# Patient Record
Sex: FEMALE | Race: WHITE | HISPANIC OR LATINO | Employment: UNEMPLOYED | URBAN - METROPOLITAN AREA
[De-identification: names, ages, dates, MRNs, and addresses within clinical notes are randomized per-mention and may not be internally consistent; named-entity substitution may affect disease eponyms.]

---

## 2019-02-15 ENCOUNTER — OFFICE VISIT (OUTPATIENT)
Dept: INTERNAL MEDICINE | Facility: CLINIC | Age: 24
End: 2019-02-15

## 2019-02-15 VITALS
TEMPERATURE: 98.1 F | RESPIRATION RATE: 16 BRPM | WEIGHT: 193.4 LBS | HEART RATE: 87 BPM | DIASTOLIC BLOOD PRESSURE: 70 MMHG | SYSTOLIC BLOOD PRESSURE: 114 MMHG | HEIGHT: 57 IN | OXYGEN SATURATION: 98 % | BODY MASS INDEX: 41.72 KG/M2

## 2019-02-15 DIAGNOSIS — Z78.9: ICD-10-CM

## 2019-02-15 DIAGNOSIS — Z13.220 LIPID SCREENING: ICD-10-CM

## 2019-02-15 DIAGNOSIS — Z13.1 DIABETES MELLITUS SCREENING: ICD-10-CM

## 2019-02-15 DIAGNOSIS — Z00.00 ANNUAL PHYSICAL EXAM: Primary | ICD-10-CM

## 2019-02-15 DIAGNOSIS — E53.8 B12 DEFICIENCY: ICD-10-CM

## 2019-02-15 DIAGNOSIS — Z83.3 FAMILY HISTORY OF DIABETES MELLITUS: ICD-10-CM

## 2019-02-15 DIAGNOSIS — E66.01 MORBID OBESITY (HCC): ICD-10-CM

## 2019-02-15 DIAGNOSIS — E55.9 VITAMIN D DEFICIENCY: ICD-10-CM

## 2019-02-15 DIAGNOSIS — Z13.29 THYROID DISORDER SCREEN: ICD-10-CM

## 2019-02-15 LAB
BILIRUB BLD-MCNC: NEGATIVE MG/DL
CLARITY, POC: CLEAR
COLOR UR: YELLOW
GLUCOSE UR STRIP-MCNC: NEGATIVE MG/DL
KETONES UR QL: NEGATIVE
LEUKOCYTE EST, POC: NEGATIVE
NITRITE UR-MCNC: NEGATIVE MG/ML
PH UR: 6 [PH] (ref 5–8)
PROT UR STRIP-MCNC: NEGATIVE MG/DL
RBC # UR STRIP: NEGATIVE /UL
SP GR UR: 1.03 (ref 1–1.03)
UROBILINOGEN UR QL: NORMAL

## 2019-02-15 PROCEDURE — 99395 PREV VISIT EST AGE 18-39: CPT | Performed by: NURSE PRACTITIONER

## 2019-02-15 PROCEDURE — 81003 URINALYSIS AUTO W/O SCOPE: CPT | Performed by: NURSE PRACTITIONER

## 2019-02-15 NOTE — PATIENT INSTRUCTIONS
Obesity, Adult  Obesity is having too much body fat. If you have a BMI of 30 or more, you are obese. BMI is a number that explains how much body fat you have. Obesity is often caused by taking in (consuming) more calories than your body uses.  Obesity can cause serious health problems. Changing your lifestyle can help to treat obesity.  Follow these instructions at home:  Eating and drinking    · Follow advice from your doctor about what to eat and drink. Your doctor may tell you to:  ? Cut down on (limit) fast foods, sweets, and processed snack foods.  ? Choose low-fat options. For example, choose low-fat milk instead of whole milk.  ? Eat 5 or more servings of fruits or vegetables every day.  ? Eat at home more often. This gives you more control over what you eat.  ? Choose healthy foods when you eat out.  ? Learn what a healthy portion size is. A portion size is the amount of a certain food that is healthy for you to eat at one time. This is different for each person.  ? Keep low-fat snacks available.  ? Avoid sugary drinks. These include soda, fruit juice, iced tea that is sweetened with sugar, and flavored milk.  ? Eat a healthy breakfast.  · Drink enough water to keep your pee (urine) clear or pale yellow.  · Do not go without eating for long periods of time (do not fast).  · Do not go on popular or trendy diets (fad diets).  Physical Activity  · Exercise often, as told by your doctor. Ask your doctor:  ? What types of exercise are safe for you.  ? How often you should exercise.  · Warm up and stretch before being active.  · Do slow stretching after being active (cool down).  · Rest between times of being active.  Lifestyle  · Limit how much time you spend in front of your TV, computer, or video game system (be less sedentary).  · Find ways to reward yourself that do not involve food.  · Limit alcohol intake to no more than 1 drink a day for nonpregnant women and 2 drinks a day for men. One drink equals 12 oz  of beer, 5 oz of wine, or 1½ oz of hard liquor.  General instructions  · Keep a weight loss journal. This can help you keep track of:  ? The food that you eat.  ? The exercise that you do.  · Take over-the-counter and prescription medicines only as told by your doctor.  · Take vitamins and supplements only as told by your doctor.  · Think about joining a support group. Your doctor may be able to help with this.  · Keep all follow-up visits as told by your doctor. This is important.  Contact a doctor if:  · You cannot meet your weight loss goal after you have changed your diet and lifestyle for 6 weeks.  This information is not intended to replace advice given to you by your health care provider. Make sure you discuss any questions you have with your health care provider.  Document Released: 03/11/2013 Document Revised: 05/25/2017 Document Reviewed: 10/05/2016  [employer] Interactive Patient Education © 2018 [employer]    Calorie Counting for Weight Loss  Calories are units of energy. Your body needs a certain amount of calories from food to keep you going throughout the day. When you eat more calories than your body needs, your body stores the extra calories as fat. When you eat fewer calories than your body needs, your body burns fat to get the energy it needs.  Calorie counting means keeping track of how many calories you eat and drink each day. Calorie counting can be helpful if you need to lose weight. If you make sure to eat fewer calories than your body needs, you should lose weight. Ask your health care provider what a healthy weight is for you.  For calorie counting to work, you will need to eat the right number of calories in a day in order to lose a healthy amount of weight per week. A dietitian can help you determine how many calories you need in a day and will give you suggestions on how to reach your calorie goal.  · A healthy amount of weight to lose per week is usually 1-2 lb (0.5-0.9 kg). This  usually means that your daily calorie intake should be reduced by 500-750 calories.  · Eating 1,200 - 1,500 calories per day can help most women lose weight.  · Eating 1,500 - 1,800 calories per day can help most men lose weight.    What do I need to know about calorie counting?  In order to meet your daily calorie goal, you will need to:  · Find out how many calories are in each food you would like to eat. Try to do this before you eat.  · Decide how much of the food you plan to eat.  · Write down what you ate and how many calories it had. Doing this is called keeping a food log.    To successfully lose weight, it is important to balance calorie counting with a healthy lifestyle that includes regular activity. Aim for 150 minutes of moderate exercise (such as walking) or 75 minutes of vigorous exercise (such as running) each week.  Where do I find calorie information?    The number of calories in a food can be found on a Nutrition Facts label. If a food does not have a Nutrition Facts label, try to look up the calories online or ask your dietitian for help.  Remember that calories are listed per serving. If you choose to have more than one serving of a food, you will have to multiply the calories per serving by the amount of servings you plan to eat. For example, the label on a package of bread might say that a serving size is 1 slice and that there are 90 calories in a serving. If you eat 1 slice, you will have eaten 90 calories. If you eat 2 slices, you will have eaten 180 calories.  How do I keep a food log?  Immediately after each meal, record the following information in your food log:  · What you ate. Don't forget to include toppings, sauces, and other extras on the food.  · How much you ate. This can be measured in cups, ounces, or number of items.  · How many calories each food and drink had.  · The total number of calories in the meal.    Keep your food log near you, such as in a small notebook in your  "pocket, or use a mobile yajaira or website. Some programs will calculate calories for you and show you how many calories you have left for the day to meet your goal.  What are some calorie counting tips?  · Use your calories on foods and drinks that will fill you up and not leave you hungry:  ? Some examples of foods that fill you up are nuts and nut butters, vegetables, lean proteins, and high-fiber foods like whole grains. High-fiber foods are foods with more than 5 g fiber per serving.  ? Drinks such as sodas, specialty coffee drinks, alcohol, and juices have a lot of calories, yet do not fill you up.  · Eat nutritious foods and avoid empty calories. Empty calories are calories you get from foods or beverages that do not have many vitamins or protein, such as candy, sweets, and soda. It is better to have a nutritious high-calorie food (such as an avocado) than a food with few nutrients (such as a bag of chips).  · Know how many calories are in the foods you eat most often. This will help you calculate calorie counts faster.  · Pay attention to calories in drinks. Low-calorie drinks include water and unsweetened drinks.  · Pay attention to nutrition labels for \"low fat\" or \"fat free\" foods. These foods sometimes have the same amount of calories or more calories than the full fat versions. They also often have added sugar, starch, or salt, to make up for flavor that was removed with the fat.  · Find a way of tracking calories that works for you. Get creative. Try different apps or programs if writing down calories does not work for you.  What are some portion control tips?  · Know how many calories are in a serving. This will help you know how many servings of a certain food you can have.  · Use a measuring cup to measure serving sizes. You could also try weighing out portions on a kitchen scale. With time, you will be able to estimate serving sizes for some foods.  · Take some time to put servings of different foods " on your favorite plates, bowls, and cups so you know what a serving looks like.  · Try not to eat straight from a bag or box. Doing this can lead to overeating. Put the amount you would like to eat in a cup or on a plate to make sure you are eating the right portion.  · Use smaller plates, glasses, and bowls to prevent overeating.  · Try not to multitask (for example, watch TV or use your computer) while eating. If it is time to eat, sit down at a table and enjoy your food. This will help you to know when you are full. It will also help you to be aware of what you are eating and how much you are eating.  What are tips for following this plan?  Reading food labels  · Check the calorie count compared to the serving size. The serving size may be smaller than what you are used to eating.  · Check the source of the calories. Make sure the food you are eating is high in vitamins and protein and low in saturated and trans fats.  Shopping  · Read nutrition labels while you shop. This will help you make healthy decisions before you decide to purchase your food.  · Make a grocery list and stick to it.  Cooking  · Try to cook your favorite foods in a healthier way. For example, try baking instead of frying.  · Use low-fat dairy products.  Meal planning  · Use more fruits and vegetables. Half of your plate should be fruits and vegetables.  · Include lean proteins like poultry and fish.  How do I count calories when eating out?  · Ask for smaller portion sizes.  · Consider sharing an entree and sides instead of getting your own entree.  · If you get your own entree, eat only half. Ask for a box at the beginning of your meal and put the rest of your entree in it so you are not tempted to eat it.  · If calories are listed on the menu, choose the lower calorie options.  · Choose dishes that include vegetables, fruits, whole grains, low-fat dairy products, and lean protein.  · Choose items that are boiled, broiled, grilled, or  steamed. Stay away from items that are buttered, battered, fried, or served with cream sauce. Items labeled “crispy” are usually fried, unless stated otherwise.  · Choose water, low-fat milk, unsweetened iced tea, or other drinks without added sugar. If you want an alcoholic beverage, choose a lower calorie option such as a glass of wine or light beer.  · Ask for dressings, sauces, and syrups on the side. These are usually high in calories, so you should limit the amount you eat.  · If you want a salad, choose a garden salad and ask for grilled meats. Avoid extra toppings like jain, cheese, or fried items. Ask for the dressing on the side, or ask for olive oil and vinegar or lemon to use as dressing.  · Estimate how many servings of a food you are given. For example, a serving of cooked rice is ½ cup or about the size of half a baseball. Knowing serving sizes will help you be aware of how much food you are eating at restaurants. The list below tells you how big or small some common portion sizes are based on everyday objects:  ? 1 oz--4 stacked dice.  ? 3 oz--1 deck of cards.  ? 1 tsp--1 die.  ? 1 Tbsp--½ a ping-pong ball.  ? 2 Tbsp--1 ping-pong ball.  ? ½ cup--½ baseball.  ? 1 cup--1 baseball.  Summary  · Calorie counting means keeping track of how many calories you eat and drink each day. If you eat fewer calories than your body needs, you should lose weight.  · A healthy amount of weight to lose per week is usually 1-2 lb (0.5-0.9 kg). This usually means reducing your daily calorie intake by 500-750 calories.  · The number of calories in a food can be found on a Nutrition Facts label. If a food does not have a Nutrition Facts label, try to look up the calories online or ask your dietitian for help.  · Use your calories on foods and drinks that will fill you up, and not on foods and drinks that will leave you hungry.  · Use smaller plates, glasses, and bowls to prevent overeating.  This information is not  intended to replace advice given to you by your health care provider. Make sure you discuss any questions you have with your health care provider.  Document Released: 12/18/2006 Document Revised: 11/17/2017 Document Reviewed: 11/17/2017  [employer] Interactive Patient Education © 2018 [employer]    Exercising to Lose Weight  Exercising can help you to lose weight. In order to lose weight through exercise, you need to do vigorous-intensity exercise. You can tell that you are exercising with vigorous intensity if you are breathing very hard and fast and cannot hold a conversation while exercising.  Moderate-intensity exercise helps to maintain your current weight. You can tell that you are exercising at a moderate level if you have a higher heart rate and faster breathing, but you are still able to hold a conversation.  How often should I exercise?  Choose an activity that you enjoy and set realistic goals. Your health care provider can help you to make an activity plan that works for you. Exercise regularly as directed by your health care provider. This may include:  · Doing resistance training twice each week, such as:  ? Push-ups.  ? Sit-ups.  ? Lifting weights.  ? Using resistance bands.  · Doing a given intensity of exercise for a given amount of time. Choose from these options:  ? 150 minutes of moderate-intensity exercise every week.  ? 75 minutes of vigorous-intensity exercise every week.  ? A mix of moderate-intensity and vigorous-intensity exercise every week.    Children, pregnant women, people who are out of shape, people who are overweight, and older adults may need to consult a health care provider for individual recommendations. If you have any sort of medical condition, be sure to consult your health care provider before starting a new exercise program.  What are some activities that can help me to lose weight?  · Walking at a rate of at least 4.5 miles an hour.  · Jogging or running at a rate of 5  miles per hour.  · Biking at a rate of at least 10 miles per hour.  · Lap swimming.  · Roller-skating or in-line skating.  · Cross-country skiing.  · Vigorous competitive sports, such as football, basketball, and soccer.  · Jumping rope.  · Aerobic dancing.  How can I be more active in my day-to-day activities?  · Use the stairs instead of the elevator.  · Take a walk during your lunch break.  · If you drive, park your car farther away from work or school.  · If you take public transportation, get off one stop early and walk the rest of the way.  · Make all of your phone calls while standing up and walking around.  · Get up, stretch, and walk around every 30 minutes throughout the day.  What guidelines should I follow while exercising?  · Do not exercise so much that you hurt yourself, feel dizzy, or get very short of breath.  · Consult your health care provider prior to starting a new exercise program.  · Wear comfortable clothes and shoes with good support.  · Drink plenty of water while you exercise to prevent dehydration or heat stroke. Body water is lost during exercise and must be replaced.  · Work out until you breathe faster and your heart beats faster.  This information is not intended to replace advice given to you by your health care provider. Make sure you discuss any questions you have with your health care provider.  Document Released: 01/20/2012 Document Revised: 05/25/2017 Document Reviewed: 05/21/2015  [employer] Interactive Patient Education © 2018 [employer]

## 2019-02-15 NOTE — PROGRESS NOTES
Subjective   Neema eJtt is a 23 y.o. female here to establish care.  Chief Complaint   Patient presents with   • Establish Care   • Annual Exam       History of Present Illness       Mild low back pain after moving furniture a few weeks ago. Getting better. No saddle anesthesia, numbness, tingling, bowel or bladder dysfunction, or LE weakness. Has not required any intervention and is nearly resolved.     Interested in contraception - established with [facility]    Health maintenance:   Influenza: declined  Tdap: 2014  Pap:2016 per GYN  Tobacco use: denies  Eye exam: DUE  Dental exam: DUE    Diet: eats fast food daily    Exercise: none    PHQ-2 Depression Screening  Little interest or pleasure in doing things? 0   Feeling down, depressed, or hopeless? 0   PHQ-2 Total Score 0       The following portions of the patient's history were reviewed and updated as appropriate: allergies, current medications, past family history, past medical history, past social history, past surgical history and problem list.    Review of Systems   Constitutional: Negative.  Negative for appetite change, chills, fatigue and fever.   HENT: Negative for congestion, ear pain, rhinorrhea, sinus pressure and sore throat.    Eyes: Negative for itching and visual disturbance.   Respiratory: Negative.  Negative for cough, shortness of breath and wheezing.    Cardiovascular: Negative for chest pain, palpitations and leg swelling.   Gastrointestinal: Negative.  Negative for abdominal pain, constipation, diarrhea, nausea and vomiting.   Endocrine: Negative for cold intolerance and heat intolerance.   Genitourinary: Negative.  Negative for difficulty urinating, dysuria and hematuria.   Musculoskeletal: Positive for back pain. Negative for arthralgias, gait problem, joint swelling and myalgias.   Skin: Negative for rash and wound.   Allergic/Immunologic: Negative for environmental allergies and food allergies.   Neurological: Negative for  "dizziness, tremors, weakness, numbness and headaches.   Hematological: Negative for adenopathy. Does not bruise/bleed easily.   Psychiatric/Behavioral: Negative for dysphoric mood and sleep disturbance. The patient is not nervous/anxious.      Blood pressure 114/70, pulse 87, temperature 98.1 °F (36.7 °C), resp. rate 16, height 145.5 cm (57.3\"), weight 87.7 kg (193 lb 6.4 oz), last menstrual period 02/03/2019, SpO2 98 %, not currently breastfeeding.    No Known Allergies  Past Medical History:   Diagnosis Date   • Cholelithiasis    • Pap smear for cervical cancer screening 2016     Past Surgical History:   Procedure Laterality Date   • CHOLECYSTECTOMY  2016   • PATELLAR TENDON GRAFT IMPLANTATION  2014   • VAGINAL DELIVERY      x2   • WISDOM TOOTH EXTRACTION       Family History   Problem Relation Age of Onset   • Diabetes Father    • Diabetes Maternal Grandmother    • Hypertension Maternal Grandmother    • Diabetes Paternal Grandmother      Social History     Socioeconomic History   • Marital status:      Spouse name: Not on file   • Number of children: Not on file   • Years of education: Not on file   • Highest education level: Not on file   Social Needs   • Financial resource strain: Not on file   • Food insecurity - worry: Not on file   • Food insecurity - inability: Not on file   • Transportation needs - medical: Not on file   • Transportation needs - non-medical: Not on file   Occupational History   • Occupation: Works at [employer]   Tobacco Use   • Smoking status: Never Smoker   • Smokeless tobacco: Never Used   Substance and Sexual Activity   • Alcohol use: No   • Drug use: No   • Sexual activity: Yes     Partners: Male     Birth control/protection: Condom   Other Topics Concern   • Not on file   Social History Narrative   • Not on file     Immunization History   Administered Date(s) Administered   • Tdap 01/01/2014     No current outpatient medications on file.    Objective   Physical Exam "   Constitutional: She is oriented to person, place, and time. She appears well-developed and well-nourished. No distress.   HENT:   Head: Normocephalic and atraumatic.   Right Ear: External ear normal.   Left Ear: External ear normal.   Nose: Nose normal.   Mouth/Throat: Oropharynx is clear and moist.   Eyes: Conjunctivae and EOM are normal. Pupils are equal, round, and reactive to light. Right eye exhibits no discharge. Left eye exhibits no discharge. No scleral icterus.   Neck: Normal range of motion. Neck supple. No JVD present. Carotid bruit is not present. No tracheal deviation present. No thyromegaly present.   Cardiovascular: Normal rate, regular rhythm, normal heart sounds and intact distal pulses. Exam reveals no gallop and no friction rub.   No murmur heard.  Pulmonary/Chest: Effort normal and breath sounds normal. No respiratory distress. She has no wheezes. She has no rales. She exhibits no tenderness. Right breast exhibits no inverted nipple, no mass, no nipple discharge, no skin change and no tenderness. Left breast exhibits no inverted nipple, no mass, no nipple discharge, no skin change and no tenderness. Breasts are symmetrical. There is no breast swelling.   Abdominal: Soft. Normal appearance and bowel sounds are normal. She exhibits no distension and no mass. There is no hepatosplenomegaly. There is no tenderness. No hernia.   Genitourinary: No breast tenderness, discharge or bleeding.   Musculoskeletal: Normal range of motion. She exhibits no edema, tenderness or deformity.   Lymphadenopathy:        Head (right side): No submental, no submandibular, no tonsillar, no preauricular, no posterior auricular and no occipital adenopathy present.        Head (left side): No submental, no submandibular, no tonsillar, no preauricular, no posterior auricular and no occipital adenopathy present.     She has no cervical adenopathy.     She has no axillary adenopathy.   Neurological: She is alert and oriented  to person, place, and time. She has normal reflexes. She displays normal reflexes.   Skin: Skin is warm and dry. Capillary refill takes less than 2 seconds. No rash noted. She is not diaphoretic. No erythema. No pallor.   Psychiatric: She has a normal mood and affect. Her behavior is normal. Thought content normal.   Nursing note and vitals reviewed.      Assessment/Plan   Neema was seen today for establish care and annual exam.    Diagnoses and all orders for this visit:    Annual physical exam  -     CBC (No Diff); Future  -     Comprehensive Metabolic Panel; Future  -     Lipid Panel; Future  -     TSH; Future  -     Vitamin B12; Future  -     Vitamin D 25 Hydroxy; Future  -     Ambulatory Referral to Nutrition Services  -     Hemoglobin A1c; Future  -     POC Urinalysis Dipstick, Automated    Morbid obesity (CMS/HCC)  -     CBC (No Diff); Future  -     Comprehensive Metabolic Panel; Future  -     Lipid Panel; Future  -     TSH; Future  -     Vitamin B12; Future  -     Vitamin D 25 Hydroxy; Future  -     Ambulatory Referral to Nutrition Services  -     Hemoglobin A1c; Future  -     POC Urinalysis Dipstick, Automated    Diabetes mellitus screening  -     Hemoglobin A1c; Future    Thyroid disorder screen  -     TSH; Future    Lipid screening  -     Lipid Panel; Future    B12 deficiency  -     Vitamin B12; Future    Vitamin D deficiency  -     Vitamin D 25 Hydroxy; Future      No outpatient encounter medications on file as of 2/15/2019.     No facility-administered encounter medications on file as of 2/15/2019.      Screenings and vaccinations: she declined flu and hep A vaccines  Counseling: diet and exercsie for weight loss, referred to dietician  Discussed contraception options- she is interested in copper  IUD. She will schedule with her GYN to further discuss/obtain  Return in about 3 months (around 5/15/2019).  Plan of care discussed with pt. They verbalized understanding and agreement.

## 2020-11-13 ENCOUNTER — LAB (OUTPATIENT)
Dept: LAB | Facility: HOSPITAL | Age: 25
End: 2020-11-13

## 2020-11-13 ENCOUNTER — TRANSCRIBE ORDERS (OUTPATIENT)
Dept: LAB | Facility: HOSPITAL | Age: 25
End: 2020-11-13

## 2020-11-13 DIAGNOSIS — R63.5 ABNORMAL WEIGHT GAIN: ICD-10-CM

## 2020-11-13 DIAGNOSIS — R63.5 ABNORMAL WEIGHT GAIN: Primary | ICD-10-CM

## 2020-11-13 LAB
ALBUMIN SERPL-MCNC: 4.3 G/DL (ref 3.5–5.2)
ALBUMIN/GLOB SERPL: 1.8 G/DL
ALP SERPL-CCNC: 81 U/L (ref 39–117)
ALT SERPL W P-5'-P-CCNC: 30 U/L (ref 1–33)
ANION GAP SERPL CALCULATED.3IONS-SCNC: 11.3 MMOL/L (ref 5–15)
AST SERPL-CCNC: 26 U/L (ref 1–32)
BILIRUB SERPL-MCNC: 0.3 MG/DL (ref 0–1.2)
BUN SERPL-MCNC: 6 MG/DL (ref 6–20)
BUN/CREAT SERPL: 9.2 (ref 7–25)
CALCIUM SPEC-SCNC: 8.8 MG/DL (ref 8.6–10.5)
CHLORIDE SERPL-SCNC: 102 MMOL/L (ref 98–107)
CHOLEST SERPL-MCNC: 170 MG/DL (ref 0–200)
CO2 SERPL-SCNC: 22.7 MMOL/L (ref 22–29)
CREAT SERPL-MCNC: 0.65 MG/DL (ref 0.57–1)
DEPRECATED RDW RBC AUTO: 45.2 FL (ref 37–54)
ERYTHROCYTE [DISTWIDTH] IN BLOOD BY AUTOMATED COUNT: 14.5 % (ref 12.3–15.4)
GFR SERPL CREATININE-BSD FRML MDRD: 110 ML/MIN/1.73
GLOBULIN UR ELPH-MCNC: 2.4 GM/DL
GLUCOSE SERPL-MCNC: 81 MG/DL (ref 65–99)
HBA1C MFR BLD: 5.5 % (ref 4.8–5.6)
HCT VFR BLD AUTO: 37.5 % (ref 34–46.6)
HDLC SERPL-MCNC: 44 MG/DL (ref 40–60)
HGB BLD-MCNC: 12.3 G/DL (ref 12–15.9)
LDLC SERPL CALC-MCNC: 93 MG/DL (ref 0–100)
LDLC/HDLC SERPL: 1.99 {RATIO}
MCH RBC QN AUTO: 28.2 PG (ref 26.6–33)
MCHC RBC AUTO-ENTMCNC: 32.8 G/DL (ref 31.5–35.7)
MCV RBC AUTO: 86 FL (ref 79–97)
PLATELET # BLD AUTO: 376 10*3/MM3 (ref 140–450)
PMV BLD AUTO: 10.2 FL (ref 6–12)
POTASSIUM SERPL-SCNC: 4.1 MMOL/L (ref 3.5–5.2)
PROT SERPL-MCNC: 6.7 G/DL (ref 6–8.5)
RBC # BLD AUTO: 4.36 10*6/MM3 (ref 3.77–5.28)
SODIUM SERPL-SCNC: 136 MMOL/L (ref 136–145)
TRIGL SERPL-MCNC: 193 MG/DL (ref 0–150)
TSH SERPL DL<=0.05 MIU/L-ACNC: 2.49 UIU/ML (ref 0.27–4.2)
VLDLC SERPL-MCNC: 33 MG/DL (ref 5–40)
WBC # BLD AUTO: 9.62 10*3/MM3 (ref 3.4–10.8)

## 2020-11-13 PROCEDURE — 83036 HEMOGLOBIN GLYCOSYLATED A1C: CPT

## 2020-11-13 PROCEDURE — 85027 COMPLETE CBC AUTOMATED: CPT

## 2020-11-13 PROCEDURE — 84443 ASSAY THYROID STIM HORMONE: CPT

## 2020-11-13 PROCEDURE — 83525 ASSAY OF INSULIN: CPT

## 2020-11-13 PROCEDURE — 36415 COLL VENOUS BLD VENIPUNCTURE: CPT | Performed by: NURSE PRACTITIONER

## 2020-11-13 PROCEDURE — 80061 LIPID PANEL: CPT | Performed by: NURSE PRACTITIONER

## 2020-11-13 PROCEDURE — 80053 COMPREHEN METABOLIC PANEL: CPT

## 2020-11-17 LAB — INSULIN SERPL-ACNC: 5.9 UIU/ML

## 2021-03-10 ENCOUNTER — HOSPITAL ENCOUNTER (EMERGENCY)
Facility: HOSPITAL | Age: 26
Discharge: HOME | End: 2021-03-10
Attending: EMERGENCY MEDICINE | Admitting: EMERGENCY MEDICINE

## 2021-03-10 VITALS
SYSTOLIC BLOOD PRESSURE: 132 MMHG | HEART RATE: 88 BPM | DIASTOLIC BLOOD PRESSURE: 95 MMHG | HEIGHT: 58 IN | OXYGEN SATURATION: 99 % | BODY MASS INDEX: 35.26 KG/M2 | WEIGHT: 168 LBS | RESPIRATION RATE: 20 BRPM | TEMPERATURE: 98.5 F

## 2021-03-10 DIAGNOSIS — S00.452A FOREIGN BODY OF LEFT EXTERNAL EAR: Primary | ICD-10-CM

## 2021-03-10 PROCEDURE — 99282 EMERGENCY DEPT VISIT SF MDM: CPT

## 2021-03-10 NOTE — ED PROVIDER NOTES
Subjective   Ms. Jett is a 25-year-old female states she was cleaning out her ear with a Q-tip.  She states that the the cotton end of this came off in her left ear.  She is been able to get it out.  States she can hear the left ear.  She had no pain and no blood from the ear canal itself.  She has no other complaints.      History provided by:  Patient   used: No    Foreign Body in Ear  Location:  Left external canal  Severity:  Mild  Onset quality:  Sudden  Timing:  Constant  Progression:  Unchanged  Chronicity:  New  Context:  Cleaning ear with Q-tip cotton came out  Relieved by:  Nothing  Worsened by:  Nothing  Associated symptoms: no abdominal pain, no congestion, no cough, no diarrhea, no ear pain, no fever, no headaches, no nausea, no shortness of breath, no sore throat and no vomiting        Review of Systems   Constitutional: Negative for chills and fever.   HENT: Negative for congestion, ear pain and sore throat.    Respiratory: Negative for cough and shortness of breath.    Gastrointestinal: Negative for abdominal pain, diarrhea, nausea and vomiting.   Musculoskeletal: Negative for back pain.   Neurological: Negative for headaches.   Psychiatric/Behavioral: Negative.    All other systems reviewed and are negative.      Past Medical History:   Diagnosis Date   • Cholelithiasis    • Pap smear for cervical cancer screening 2016       No Known Allergies    Past Surgical History:   Procedure Laterality Date   • CHOLECYSTECTOMY  2016   • PATELLAR TENDON GRAFT IMPLANTATION  2014   • VAGINAL DELIVERY      x2   • WISDOM TOOTH EXTRACTION         Family History   Problem Relation Age of Onset   • Diabetes Father    • Diabetes Maternal Grandmother    • Hypertension Maternal Grandmother    • Diabetes Paternal Grandmother        Social History     Socioeconomic History   • Marital status:      Spouse name: Not on file   • Number of children: Not on file   • Years of education: Not on file    • Highest education level: Not on file   Tobacco Use   • Smoking status: Never Smoker   • Smokeless tobacco: Never Used   Substance and Sexual Activity   • Alcohol use: No   • Drug use: No   • Sexual activity: Yes     Partners: Male     Birth control/protection: Condom           Objective   Physical Exam  Vitals and nursing note reviewed.   Constitutional:       Appearance: Normal appearance.   HENT:      Head: Normocephalic and atraumatic.      Right Ear: Tympanic membrane normal.      Ears:      Comments: Left external canal was obstructed with foreign object that appeared to be cotton.     Nose: Nose normal.      Mouth/Throat:      Mouth: Mucous membranes are moist.   Eyes:      Extraocular Movements: Extraocular movements intact.   Pulmonary:      Effort: Pulmonary effort is normal. No respiratory distress.   Musculoskeletal:      Cervical back: Normal range of motion and neck supple.   Skin:     General: Skin is warm.   Neurological:      Mental Status: She is alert.   Psychiatric:         Mood and Affect: Mood normal.         Behavior: Behavior normal.         Thought Content: Thought content normal.         Judgment: Judgment normal.         Procedures           ED Course  ED Course as of Mar 12 0814   Wed Mar 10, 2021   1539 Procedure note.  The foreign body was able to be visualized easily with an autoscope.  I was able to use forceps with out teeth and easily remove this.  Reinspection of the external canal revealed no abrasions trauma and the tympanic membrane was intact.    [TRIP]      ED Course User Index  [TRIP] Andrew Hodges PA                                           MDM  Number of Diagnoses or Management Options  Foreign body of left external ear: new and requires workup     Amount and/or Complexity of Data Reviewed  Discuss the patient with other providers: yes    Patient Progress  Patient progress: stable      Final diagnoses:   Foreign body of left external ear            Andrew Hodges  DOUG Larry  03/12/21 0814

## 2021-08-05 ENCOUNTER — TRANSCRIBE ORDERS (OUTPATIENT)
Dept: NUTRITION | Facility: HOSPITAL | Age: 26
End: 2021-08-05

## 2021-08-05 DIAGNOSIS — R63.5 ABNORMAL WEIGHT GAIN: Primary | ICD-10-CM

## 2022-04-26 ENCOUNTER — HOSPITAL ENCOUNTER (OUTPATIENT)
Dept: RADIOLOGY | Facility: CLINIC | Age: 27
Discharge: HOME | End: 2022-04-26

## 2022-04-29 ENCOUNTER — OFFICE VISIT (OUTPATIENT)
Dept: ORTHOPEDIC SURGERY | Facility: CLINIC | Age: 27
End: 2022-04-29

## 2022-04-29 VITALS
WEIGHT: 173 LBS | BODY MASS INDEX: 34.88 KG/M2 | HEIGHT: 59 IN | SYSTOLIC BLOOD PRESSURE: 130 MMHG | DIASTOLIC BLOOD PRESSURE: 86 MMHG

## 2022-04-29 DIAGNOSIS — M25.561 RIGHT KNEE PAIN, UNSPECIFIED CHRONICITY: Primary | ICD-10-CM

## 2022-04-29 DIAGNOSIS — M25.361 PATELLAR INSTABILITY OF RIGHT KNEE: ICD-10-CM

## 2022-04-29 DIAGNOSIS — S86.811A RUPTURE OF RIGHT PATELLAR TENDON, INITIAL ENCOUNTER: ICD-10-CM

## 2022-04-29 PROCEDURE — 99204 OFFICE O/P NEW MOD 45 MIN: CPT | Performed by: PHYSICIAN ASSISTANT

## 2022-04-29 NOTE — PROGRESS NOTES
[facility] [place] [facility] [place]    Subjective     Chief Complaint   Patient presents with   • Right Knee - Pain        HPI  Neema Jett is a 26 y.o. female.  New patient presents for evaluation of right knee pain/instability.  Symptoms/pain have been ongoing for the past 6 months but since Monday she has been unable to straighten her knee.  GENEVA: No specific history of injury or trauma recently but she did have a patellar tendon rupture that was repaired by Dr. Tenzin Borja in 2014.    Pain scale: 6/10.  Severity of the pain moderate to severe.  Quality of the pain aching, stabbing.  Associated symptoms swelling, stiffness with an inability to straighten the leg since Monday.  Activity related to pain walking, standing and stair climbing.  Pain relieved by resting, sitting, icing and elevating.  No reported numbness or tingling.  Prior treatments she has been wearing a regular neoprene hinged brace.    Denies fever, chills, night sweats or other constitutional symptoms.      Past Medical History:   Diagnosis Date   • Cholelithiasis    • Dislocation of right knee    • Pap smear for cervical cancer screening 2016      Past Surgical History:   Procedure Laterality Date   • CHOLECYSTECTOMY  2016   • KNEE SURGERY Right    • PATELLAR TENDON GRAFT IMPLANTATION  2014   • VAGINAL DELIVERY      x2   • WISDOM TOOTH EXTRACTION        Family History   Problem Relation Age of Onset   • Diabetes Father    • Diabetes Maternal Grandmother    • Hypertension Maternal Grandmother    • Diabetes Paternal Grandmother      Social History     Socioeconomic History   • Marital status:    Tobacco Use   • Smoking status: Never Smoker   • Smokeless tobacco: Never Used   Vaping Use   • Vaping Use: Never used   Substance and Sexual Activity   • Alcohol use: No   • Drug use: No   • Sexual activity: Yes     Partners: Male     Birth control/protection: Condom      Current Outpatient Medications on File Prior to Visit   Medication  "Sig Dispense Refill   • diclofenac (VOLTAREN) 50 MG EC tablet Take 1 tablet by mouth 2 (Two) Times a Day As Needed (knee pain). 30 tablet 0     No current facility-administered medications on file prior to visit.      No Known Allergies     The following portions of the patient's history were reviewed and updated as appropriate: allergies, current medications, past family history, past medical history, past social history, past surgical history and problem list.    Review of Systems   Constitutional: Negative.    HENT: Negative.    Eyes: Negative.    Respiratory: Negative.    Cardiovascular: Negative.    Gastrointestinal: Negative.    Endocrine: Negative.    Genitourinary: Negative.    Musculoskeletal: Positive for arthralgias.   Skin: Negative.    Allergic/Immunologic: Negative.    Neurological: Negative.    Hematological: Negative.    Psychiatric/Behavioral: Negative.         Objective      Physical Exam  /86   Ht 149.9 cm (59\")   Wt 78.5 kg (173 lb)   LMP 04/01/2022 (Exact Date)   BMI 34.94 kg/m²     Body mass index is 34.94 kg/m².    GENERAL APPEARANCE: awake, alert & oriented x 3, in no acute distress and well developed, well nourished  PSYCH: normal mood and affect  LUNGS:  breathing nonlabored, no wheezing  EYES: sclera anicteric, pupils equal  CARDIOVASCULAR: palpable pulses. Capillary refill less than 2 seconds  INTEGUMENTARY: skin intact, no clubbing, cyanosis  NEUROLOGIC:  Normal Sensation         Ortho Exam  Right knee  Skin: Intact without any erythema, warmth.  Positive swelling/effusion noted.  Motion: Patient sitting with knee flexed to 90 upon extension she lacks approximately 30 degrees.  Tenderness: Positive tenderness noted along patellar tendon with questionable defect noted.  Instability: Anterior drawer negative.  Lachman negative. Posterior drawer negative.  Varus and valgus stress negative.    Motor: Grossly intact Q/HS/TA/GS/EHL/P  Sensory: Grossly intact DP/SP/S/S/T nerve " distributions.   Vascular: 2+ DP/PT  pulses with brisk capillary refill into each digit.    Unable to fully extend knee.  Lacks approximately 30 degrees short of full extension.  Placed the leg in full extension and asked patient to hold unable to do so.      Imaging/Studies  Dr. Machuca and I reviewed plain film imaging performed on 4/26/2022.    DATE OF EXAM: 4/26/2022 9:32 AM     PROCEDURE: XR KNEE 3 VW RIGHT-     INDICATIONS: sudden pain, swelling right knee. h/o dislocation;  M25.561-Pain in right knee     COMPARISON: No comparisons available.     TECHNIQUE: Three radiologic views of the right knee were obtained.     FINDINGS:  There is no evidence for displaced fracture or dislocation. A joint  effusion is observed. Tricompartmental changes of arthropathy are noted.  There is joint space narrowing and subchondral sclerosis. Osteophytosis  is also noted. These findings are mild to moderate. No definitive  intra-articular or periarticular erosion is seen. There is evidence for  partial ossification of the medial patellofemoral ligament suggesting  changes of prior injury and the sequelae of prior transient lateral  patellofemoral dislocation.     IMPRESSION:  1.  No evidence for displaced fracture or dislocation.  2.  Tricompartmental arthropathy suggesting mild to moderate  osteoarthritis. These changes are advanced for the patient's age.  3.  There is a joint effusion.  4.  Findings suggesting changes related to the sequelae of prior  transient lateral patellofemoral dislocation with associated partial  ossification of the medial patellofemoral ligament. Correlation with MRI  of the right knee would be helpful for better characterization.     This report was finalized on 4/26/2022 9:56 AM by Laron Cesar MD.    Assessment/Plan        ICD-10-CM ICD-9-CM   1. Right knee pain, unspecified chronicity  M25.561 719.46   2. Rupture of right patellar tendon, initial encounter  S86.811A 844.8   3. Patellar instability  of right knee  M25.361 718.86       Orders Placed This Encounter   Procedures   • MRI Knee Right Without Contrast        -Right knee pain due to partial tear patellar tendon (inability to fully extend leg) and patellar instability.  -Ordered MRI to assess patellar tendon as well as MPFL, lateral compartment secondary to prior patellar dislocation/instability.  -Placed in a TROM brace locked in extension.  This includes sleeping and it locked in extension.  Until the MRI is completed no flexion of the knee.  -Patient was prescribed diclofenac by the urgent care.  Continue to use to help with inflammation/pain control.  -Recommend ice and elevation to also help assist with inflammation/pain control.  -Follow-up after MRI completed.  This appointment will need to be on a Monday or Friday when Dr. Machuca is also available for consultation.  -Questions and concerns answered.    History, exam and imaging all discussed with Dr. Machuca who agrees with the above assessment and plan.    Medical Decision Making  Management Options : prescription/IM medicine  Data/Risk: radiology tests       Zuleima Curtis PA-C  04/29/22  13:36 EDT               EMR Dragon/Transcription disclaimer:  Much of this encounter note is an electronic transcription of spoken language to printed text. Electronic transcription of spoken language may permit erroneous, or at times, nonsensical words or phrases to be inadvertently transcribed. Although I have reviewed the note for such errors, some may still exist.

## 2022-05-10 ENCOUNTER — HOSPITAL ENCOUNTER (OUTPATIENT)
Dept: MRI IMAGING | Facility: HOSPITAL | Age: 27
Discharge: HOME | End: 2022-05-10
Admitting: PHYSICIAN ASSISTANT

## 2022-05-10 DIAGNOSIS — M25.361 PATELLAR INSTABILITY OF RIGHT KNEE: ICD-10-CM

## 2022-05-10 DIAGNOSIS — S86.811A RUPTURE OF RIGHT PATELLAR TENDON, INITIAL ENCOUNTER: ICD-10-CM

## 2022-05-10 DIAGNOSIS — M25.561 RIGHT KNEE PAIN, UNSPECIFIED CHRONICITY: ICD-10-CM

## 2022-05-10 PROCEDURE — 73721 MRI JNT OF LWR EXTRE W/O DYE: CPT

## 2022-05-13 ENCOUNTER — OFFICE VISIT (OUTPATIENT)
Dept: ORTHOPEDIC SURGERY | Facility: CLINIC | Age: 27
End: 2022-05-13

## 2022-05-13 VITALS
DIASTOLIC BLOOD PRESSURE: 90 MMHG | WEIGHT: 173.06 LBS | SYSTOLIC BLOOD PRESSURE: 128 MMHG | HEIGHT: 59 IN | BODY MASS INDEX: 34.89 KG/M2

## 2022-05-13 DIAGNOSIS — M25.361 PATELLAR INSTABILITY OF RIGHT KNEE: ICD-10-CM

## 2022-05-13 DIAGNOSIS — M25.561 RIGHT KNEE PAIN, UNSPECIFIED CHRONICITY: Primary | ICD-10-CM

## 2022-05-13 DIAGNOSIS — M76.50 PATELLAR TENDINOSIS: ICD-10-CM

## 2022-05-13 PROCEDURE — 99213 OFFICE O/P EST LOW 20 MIN: CPT | Performed by: PHYSICIAN ASSISTANT

## 2022-05-13 NOTE — PROGRESS NOTES
"    [facility] [place] [facility] Note        Subjective     CC: Follow-up (Post MRI 05/10/22 - Rupture of right patellar tendon)      BAYRON Jett is a 26 y.o. female.  Patient returns today for MRI follow-up right knee.  She has been wearing the TROM brace locked in extension as directed.    Notes improvement of pain with pain scale 3/10.  Still notes aching and stabbing sensation to the knee.  Associated symptoms continue to be inability to fully straighten knee except when the brace is on and keeps her knee in extension.  Activities related to pain, walking, stair climbing.  Once again no reported numbness or tingling into the extremity.    Overall, patient's symptoms are improved.    ROS:    Constiutional:Pt denies fever, chills, nausea, or vomiting.  MSK:as above        Objective      Past Medical History  Past Medical History:   Diagnosis Date   • Cholelithiasis    • Dislocation of right knee    • Pap smear for cervical cancer screening 2016         Physical Exam  /90   Ht 149.9 cm (59.02\")   Wt 78.5 kg (173 lb 1 oz)   BMI 34.94 kg/m²     Body mass index is 34.94 kg/m².    Patient is well nourished and well developed.        Ortho Exam  Right knee  Skin: Intact without any erythema, warmth.  Positive trace effusion noted  Motion: Still lacks 20 to 30 degrees full extension when performing knee extension.  Tenderness: Continues to note tenderness lora-/retropatellar.    Patella: Patella sits laterally.  Positive apprehension.  Instability: Lachman negative. Varus and valgus stress negative.    Motor/sensory: Grossly intact L2-S1.   Vascular: 2+ DP/PT  pulses with brisk capillary refill into each digit.       Imaging/Labs/EMG Reviewed:  Dr. Machuca and I reviewed patient's MRI prior to her appointment today.    MRI KNEE RIGHT  WO CONTRAST     Date of Exam: 5/10/2022 16:10 EDT     Indication: Suspect partial tear of patellar tendon--unable to fully extend lower leg.  History of patellar " dislocation in past.     Comparison: Radiograph 4/26/2022, right knee MRI 8/19/2014.     Technique: Multiplanar multisequence images of the knee were performed according to routine knee MRI protocol.     FINDINGS:  Osseous Structures and Intra-Articular  Bone marrow signal intensity is normal. No osseous contusions or fractures. There is moderate patellofemoral compartment and mild to moderate medial lateral compartment joint space narrowing with small osteophytes. Small joint effusion. Mild edema and   superolateral Hoffa's fat. No intra-articular loose bodies..     Ligaments  The anterior cruciate ligament and posterior cruciate ligaments are intact.  Medial collateral ligament and lateral collateral ligament complex are intact.     Menisci  There is an oblique nondisplaced tear along the periphery of the posterior horn medial meniscus involving intra-articular margin.  No lateral meniscal tears.     Extensor compartment  There is lateral subluxation of patella. The trochlear groove is shallow. There is patella bernardo. The trochlear groove to target distance is increased and measures about 2.2 cm. The extensor mechanism is intact. There is slight tendinosis of the proximal   patellar tendon. The quadriceps tendon is intact.     Cartilage  There are full-thickness cartilage defects along the patellar apex and lateral patellar pole. There is full-thickness cartilage loss along the lateral femoral trochlea and partial-thickness defects of the central trochlea. There is diffuse thinning   cartilage in the medial and lateral compartment. Full-thickness cartilage defect posterior lateral femoral condyle measures 5 mm.     Soft tissues  No soft tissue masses.   No Baker cyst or evidence of recent Baker cyst rupture.     Miscellaneous  Iliotibial band is normal.  Popliteus muscle and tendon are normal in appearance.     IMPRESSION:  1.there are findings related to patellar tracking abnormality with shallow trochlea, patella  bernardo, lateral subluxation of patella and increased trochlear groove tibiotubercle distance. There are no findings of recent transient patellar dislocation on   today's study.  2.There is moderate patellofemoral compartment and mild to moderate medial and lateral compartment arthritis with small effusion.  3.There is tendinosis the proximal patellar tendon with edema in the superolateral Hoffa's fat which can be seen with patellar tendon, lateral femoral condyle friction syndrome.  4.There is a nondisplaced obliquely oriented tear along the intra-articular surface of the periphery of the posterior horn the medial meniscus.        Electronically Signed: Tana Herndon MD 5/10/2022 17:27 EDT      Assessment:  1. Right knee pain, unspecified chronicity    2. Patellar instability of right knee    3. Patellar tendinosis        Plan:  1. Continued right knee pain due to patellar instability and patellar tendinosis.  2. Based on exam and MRI findings believe MPFL stretched nonfunctional which is causing patient's symptoms.  3. DC TROM brace and provided patellar stabilizing brace.  4. Ordered formal PT (RANDI Ramirez).  5. Recommend over-the-counter pain medication/NSAIDs as needed.  6. Follow-up in 4 weeks with Dr. Machuca.  If still not improved may consider MPFL reconstruction.  7. Questions and concerns answered.      Zuleima Curtis PA-C  05/16/22  08:12 EDT      Dictated Utilizing Dragon Dictation.

## 2022-06-02 ENCOUNTER — APPOINTMENT (OUTPATIENT)
Dept: MRI IMAGING | Facility: HOSPITAL | Age: 27
End: 2022-06-02

## 2022-06-07 ENCOUNTER — TREATMENT (OUTPATIENT)
Dept: PHYSICAL THERAPY | Facility: CLINIC | Age: 27
End: 2022-06-07

## 2022-06-07 DIAGNOSIS — M25.361 PATELLAR INSTABILITY OF RIGHT KNEE: ICD-10-CM

## 2022-06-07 DIAGNOSIS — M25.561 RIGHT KNEE PAIN, UNSPECIFIED CHRONICITY: Primary | ICD-10-CM

## 2022-06-07 DIAGNOSIS — M76.50 PATELLAR TENDINOSIS: ICD-10-CM

## 2022-06-07 PROCEDURE — 97162 PT EVAL MOD COMPLEX 30 MIN: CPT | Performed by: PHYSICAL THERAPIST

## 2022-06-07 NOTE — PROGRESS NOTES
Physical Therapy Initial Evaluation and Plan of Care    Patient: Neema Jett   : 1995  Diagnosis/ICD-10 Code:  Right knee pain, unspecified chronicity [M25.561]  Referring practitioner: Zuleima Curtis, *  Date of Initial Visit: 2022  Today's Date: 2022  Patient seen for 1 sessions         Visit Diagnoses:    ICD-10-CM ICD-9-CM   1. Right knee pain, unspecified chronicity  M25.561 719.46   2. Patellar instability of right knee  M25.361 718.86   3. Patellar tendinosis  M76.50 727.89       Subjective Questionnaire: LEFS: 61      Subjective Evaluation    History of Present Illness  Date of onset: 2022  Mechanism of injury: Pt reports she has h/o right patellar tendon rupture, repaired in  by Dr. Borja. States she did have PT after surgery. Pt reports she began to increase activity at the gym starting in . States she would notice knee pain with long walks, treadmill incline walking, or squatting. States her knee would swell and become painful. Pt reports her right knee pain significantly increased about one month ago, recalls she was walking outside, felt instability of knee, couldn't extend knee fully, was off work for 3 weeks. Had MRI performed, revealed patella bernardo, degenerative changes, and possible posterior horn medial meniscal tear. States she saw ortho, has been wearing a knee brace, states her knee has been feeling better overall. States she hasn't done any physical activity since, scared to perform exercises or go to gym. States she was taking Voltaren with the swelling. Goal is to decrease pain, return to gym, and assist with weight loss for improved health, return to walking and jogging.     Subjective comment: Patient reports right knee pain is primary concern.   Patient Occupation: Works for the schools, food services. Prolonged standing, some lifting. Out for the summer currently; , has 2 children (8.10). Has basement she can use stairs, doesn't have to  daily.  Pain  Current pain ratin  At best pain ratin  At worst pain ratin  Quality: pressure and discomfort  Relieving factors: change in position and medications  Aggravating factors: ambulation, squatting, lifting, stairs, prolonged positioning and standing  Progression: improved    Social Support  Lives in: multiple-level home    Hand dominance: right    Diagnostic Tests  MRI studies: abnormal    Patient Goals  Patient goals for therapy: decreased pain, increased strength and increased motion             Objective          Observations     Additional Knee Observation Details  R patellar tendon repair scar from 2014.     Tenderness     Right Knee   Tenderness in the lateral patella and medial patella. No tenderness in the fibular head.     Neurological Testing     Sensation     Knee   Left Knee   Intact: light touch    Right Knee   Intact: light touch     Active Range of Motion   Left Knee   Flexion: 135 degrees   Extension: 0 degrees     Right Knee   Flexion: 126 degrees   Extension: 0 degrees     Patellar Mobility   Left Knee Patellar tendons within functional limits include the medial, lateral, superior and inferior.     Right Knee Hypermobile in the medial, lateral, superior and inferior patellar tendon(s).     Patellar Static Positioning   Right Knee: bernardo    Strength/Myotome Testing     Left Knee   Flexion: 5  Extension: 5    Right Knee   Flexion: 4  Extension: 4  Quadriceps contraction: fair    Ambulation     Comments   Pt ambulates with step through gait pattern, denies pain with short distances. Pain increases with prolonged walking, incline walking.     Did not assess stairs today          Assessment & Plan     Assessment  Impairments: abnormal muscle firing, abnormal or restricted ROM, activity intolerance, impaired balance, impaired physical strength, lacks appropriate home exercise program and pain with function  Functional Limitations: walking, uncomfortable because of pain and  standing  Assessment details: Pt is a 25 yo female referred to PT for right knee pain who has h/o patellar tendon rupture that was repaired by Dr. Borja in 2014. She recently reports acute onset of right knee pain with correlated increased activity of walking/jogging since January. She demonstrates weakness of RLE, hypermobility of patella, and signs and symptoms consistent with patellar instability. Recent MRI performed, revealed medial meniscal posterior horn tear and signs of patellar tendinosis, will trial conservative treatment first. Pt wasn't wearing patellar stabilizing brace on arrival. Encouraged her to bring knee brace next visit.     Goals  Plan Goals: Short term goals (3-4 weeks)  1. Patient to report rightknee pain less than or equal to 2/10.  2. Patient to demonstrate right knee flexion to at least 130 degrees.  3. Patient to be compliant with initial HEP.  4. Patient will report decreased pain rating on VAS by at least 50% with recreational activities.  5. Patient will be independent with patellar and scar tissue mobilization techniques.    Long Term goals (6-8 weeks)  1. Patient to ascend and descend eight 8 inch steps reciprocally with one handrail with minimal to no antalgia or difficulty.  2. Patient will demonstrate independent HEP progressed for closed chain strength, proprioception, balance and agility with minimal or no compensations or exacerbations  3. Patient to report pain intermittently equal to zero.  4. Patient will demonstrate improved functional outcome measure by 15 points  5. Pt will return to walk/jog program at gym on treadmill w/o reproduction of right knee pain.       Plan  Therapy options: will be seen for skilled therapy services  Planned modality interventions: cryotherapy, dry needling, electrical stimulation/Russian stimulation and thermotherapy (hydrocollator packs)  Planned therapy interventions: abdominal trunk stabilization, body mechanics training, flexibility,  functional ROM exercises, gait training, home exercise program, joint mobilization, manual therapy, neuromuscular re-education, spinal/joint mobilization, strengthening, stretching and therapeutic activities  Frequency: 2x week  Duration in visits: 12  Duration in weeks: 6  Treatment plan discussed with: patient        History # of Personal factors and/or comorbidities: MODERATE (1-2)  Examination of Body System(s): # of elements: MODERATE (3)  Clinical Presentation: STABLE   Clinical Decision Making: MODERATE      Timed:  Manual Therapy:    0     mins  50007;  Therapeutic Exercise:    0     mins  75069;     Neuromuscular Valentina:    0    mins  13294;    Therapeutic Activity:     0     mins  37872;     Gait Trainin     mins  93853;     Ultrasound:     0     mins  59223;    Ionto   __0_  mins  72113;    Un-Timed:  Electrical Stimulation:    0     mins  13211 ( );  Dry Needling     0     mins self-pay  Traction  _ 0_   mins  91450  Low Eval  __0_ mins  89946  Mod Eval  _45__ mins  48752  High Eval  _0__ mins   30165    Timed Treatment:   0   mins   Total Treatment:     45   mins    PT SIGNATURE: Corinne E. Perkins, PT   [place] License: [id number]      Certification Period: 2022 thru 2022  I certify that the therapy services are furnished while this patient is under my care.  The services outlined above are required by this patient, and will be reviewed every 90 days.        Physician Signature: _______________________________________________________________________________________________________     PHYSICIAN: Zlueima Curtis PA-C   NPI: [id number]     DATE:                                             Please sign and return via fax to [email or web address] . Thank you, [facility].

## 2022-06-08 ENCOUNTER — TELEPHONE (OUTPATIENT)
Dept: INTERNAL MEDICINE | Facility: CLINIC | Age: 27
End: 2022-06-08

## 2022-06-09 ENCOUNTER — TREATMENT (OUTPATIENT)
Dept: PHYSICAL THERAPY | Facility: CLINIC | Age: 27
End: 2022-06-09

## 2022-06-09 DIAGNOSIS — M76.50 PATELLAR TENDINOSIS: ICD-10-CM

## 2022-06-09 DIAGNOSIS — M25.561 RIGHT KNEE PAIN, UNSPECIFIED CHRONICITY: Primary | ICD-10-CM

## 2022-06-09 DIAGNOSIS — M25.361 PATELLAR INSTABILITY OF RIGHT KNEE: ICD-10-CM

## 2022-06-09 PROCEDURE — 97110 THERAPEUTIC EXERCISES: CPT | Performed by: PHYSICAL THERAPIST

## 2022-06-09 NOTE — PROGRESS NOTES
Physical Therapy Daily Treatment Note      Patient: Neema Jett   : 1995  Referring practitioner: Zuleima Curtis, *  Date of Initial Visit: Type: THERAPY  Noted: 2022  Today's Date: 2022  Patient seen for 2 sessions       Visit Diagnoses:    ICD-10-CM ICD-9-CM   1. Right knee pain, unspecified chronicity  M25.561 719.46   2. Patellar instability of right knee  M25.361 718.86   3. Patellar tendinosis  M76.50 727.89       Subjective   Patient reports her knee is feeling okay this morning, states she forgot her brace at home, states it doesn't fit her very well so she hasn't been wearing it often. States  her pain level is 0-1/10 upon arrival.      Objective   See Exercise, Manual, and Modality Logs for complete treatment.       Assessment/Plan  Pt tolerated exercises well, denies increased knee pain. Mild fatigue noted with hip abduction exercises and SLS activity. Progress written HEP next visit. Discussed 3 way hip and SLR for home exercise currently.     Timed:         Manual Therapy:    0     mins  26042;     Therapeutic Exercise:    38     mins  70154;     Neuromuscular Valentina:    0    mins  76700;    Therapeutic Activity:     0     mins  98614;     Gait Trainin     mins  39299;     Ultrasound:     0     mins  91701;    Ionto                               0    mins   44309  Self Care                       0     mins   08189  Canalith Repos    0     mins 30803      Un-Timed:  Electrical Stimulation:    0     mins  78968 ( );  Dry Needling     0     mins self-pay  Traction     0     mins 40232      Timed Treatment:   38   mins   Total Treatment:     38   mins    Corinne E. Perkins, PT  [place] License: [id number]

## 2022-06-28 ENCOUNTER — TREATMENT (OUTPATIENT)
Dept: PHYSICAL THERAPY | Facility: CLINIC | Age: 27
End: 2022-06-28

## 2022-06-28 DIAGNOSIS — M25.561 RIGHT KNEE PAIN, UNSPECIFIED CHRONICITY: Primary | ICD-10-CM

## 2022-06-28 DIAGNOSIS — M25.361 PATELLAR INSTABILITY OF RIGHT KNEE: ICD-10-CM

## 2022-06-28 DIAGNOSIS — M76.50 PATELLAR TENDINOSIS: ICD-10-CM

## 2022-06-28 PROCEDURE — 97110 THERAPEUTIC EXERCISES: CPT | Performed by: PHYSICAL THERAPIST

## 2022-07-01 ENCOUNTER — TELEPHONE (OUTPATIENT)
Dept: PHYSICAL THERAPY | Facility: CLINIC | Age: 27
End: 2022-07-01

## 2022-08-19 ENCOUNTER — OFFICE VISIT (OUTPATIENT)
Dept: FAMILY MEDICINE CLINIC | Facility: CLINIC | Age: 27
End: 2022-08-19

## 2022-08-19 ENCOUNTER — LAB (OUTPATIENT)
Dept: LAB | Facility: HOSPITAL | Age: 27
End: 2022-08-19

## 2022-08-19 ENCOUNTER — PATIENT ROUNDING (BHMG ONLY) (OUTPATIENT)
Dept: FAMILY MEDICINE CLINIC | Facility: CLINIC | Age: 27
End: 2022-08-19

## 2022-08-19 VITALS
HEART RATE: 94 BPM | DIASTOLIC BLOOD PRESSURE: 86 MMHG | HEIGHT: 59 IN | SYSTOLIC BLOOD PRESSURE: 132 MMHG | BODY MASS INDEX: 35.44 KG/M2 | OXYGEN SATURATION: 99 % | WEIGHT: 175.8 LBS

## 2022-08-19 DIAGNOSIS — Z00.00 HEALTHCARE MAINTENANCE: ICD-10-CM

## 2022-08-19 DIAGNOSIS — N92.0 MENORRHAGIA WITH REGULAR CYCLE: ICD-10-CM

## 2022-08-19 DIAGNOSIS — Z00.00 ENCOUNTER FOR MEDICAL EXAMINATION TO ESTABLISH CARE: ICD-10-CM

## 2022-08-19 DIAGNOSIS — F41.8 DEPRESSION WITH ANXIETY: Primary | ICD-10-CM

## 2022-08-19 DIAGNOSIS — E66.9 OBESITY (BMI 35.0-39.9 WITHOUT COMORBIDITY): ICD-10-CM

## 2022-08-19 DIAGNOSIS — F51.01 PRIMARY INSOMNIA: ICD-10-CM

## 2022-08-19 DIAGNOSIS — Z91.89 AT RISK FOR DIABETES MELLITUS: ICD-10-CM

## 2022-08-19 DIAGNOSIS — E55.9 VITAMIN D DEFICIENCY: ICD-10-CM

## 2022-08-19 PROBLEM — S83.104A DISLOCATION OF RIGHT KNEE: Status: ACTIVE | Noted: 2022-08-19

## 2022-08-19 PROBLEM — K80.20 CHOLELITHIASIS: Status: ACTIVE | Noted: 2022-08-19

## 2022-08-19 LAB
25(OH)D3 SERPL-MCNC: 29.9 NG/ML (ref 30–100)
ALBUMIN SERPL-MCNC: 4.6 G/DL (ref 3.5–5.2)
ALBUMIN/GLOB SERPL: 1.8 G/DL
ALP SERPL-CCNC: 95 U/L (ref 39–117)
ALT SERPL W P-5'-P-CCNC: 18 U/L (ref 1–33)
ANION GAP SERPL CALCULATED.3IONS-SCNC: 9 MMOL/L (ref 5–15)
AST SERPL-CCNC: 22 U/L (ref 1–32)
BASOPHILS # BLD AUTO: 0.07 10*3/MM3 (ref 0–0.2)
BASOPHILS NFR BLD AUTO: 0.8 % (ref 0–1.5)
BILIRUB SERPL-MCNC: 0.5 MG/DL (ref 0–1.2)
BUN SERPL-MCNC: 8 MG/DL (ref 6–20)
BUN/CREAT SERPL: 11.9 (ref 7–25)
CALCIUM SPEC-SCNC: 9.9 MG/DL (ref 8.6–10.5)
CHLORIDE SERPL-SCNC: 103 MMOL/L (ref 98–107)
CHOLEST SERPL-MCNC: 174 MG/DL (ref 0–200)
CO2 SERPL-SCNC: 28 MMOL/L (ref 22–29)
CREAT SERPL-MCNC: 0.67 MG/DL (ref 0.57–1)
DEPRECATED RDW RBC AUTO: 41.2 FL (ref 37–54)
EGFRCR SERPLBLD CKD-EPI 2021: 123 ML/MIN/1.73
EOSINOPHIL # BLD AUTO: 0.11 10*3/MM3 (ref 0–0.4)
EOSINOPHIL NFR BLD AUTO: 1.2 % (ref 0.3–6.2)
ERYTHROCYTE [DISTWIDTH] IN BLOOD BY AUTOMATED COUNT: 14 % (ref 12.3–15.4)
GLOBULIN UR ELPH-MCNC: 2.6 GM/DL
GLUCOSE SERPL-MCNC: 102 MG/DL (ref 65–99)
HBA1C MFR BLD: 5.5 % (ref 4.8–5.6)
HCT VFR BLD AUTO: 36.6 % (ref 34–46.6)
HDLC SERPL-MCNC: 54 MG/DL (ref 40–60)
HGB BLD-MCNC: 12.2 G/DL (ref 12–15.9)
IMM GRANULOCYTES # BLD AUTO: 0.06 10*3/MM3 (ref 0–0.05)
IMM GRANULOCYTES NFR BLD AUTO: 0.6 % (ref 0–0.5)
LDLC SERPL CALC-MCNC: 104 MG/DL (ref 0–100)
LDLC/HDLC SERPL: 1.89 {RATIO}
LYMPHOCYTES # BLD AUTO: 1.61 10*3/MM3 (ref 0.7–3.1)
LYMPHOCYTES NFR BLD AUTO: 17.4 % (ref 19.6–45.3)
MCH RBC QN AUTO: 27.4 PG (ref 26.6–33)
MCHC RBC AUTO-ENTMCNC: 33.3 G/DL (ref 31.5–35.7)
MCV RBC AUTO: 82.1 FL (ref 79–97)
MONOCYTES # BLD AUTO: 0.55 10*3/MM3 (ref 0.1–0.9)
MONOCYTES NFR BLD AUTO: 5.9 % (ref 5–12)
NEUTROPHILS NFR BLD AUTO: 6.86 10*3/MM3 (ref 1.7–7)
NEUTROPHILS NFR BLD AUTO: 74.1 % (ref 42.7–76)
NRBC BLD AUTO-RTO: 0 /100 WBC (ref 0–0.2)
PLATELET # BLD AUTO: 440 10*3/MM3 (ref 140–450)
PMV BLD AUTO: 10.1 FL (ref 6–12)
POTASSIUM SERPL-SCNC: 4.3 MMOL/L (ref 3.5–5.2)
PROT SERPL-MCNC: 7.2 G/DL (ref 6–8.5)
RBC # BLD AUTO: 4.46 10*6/MM3 (ref 3.77–5.28)
SODIUM SERPL-SCNC: 140 MMOL/L (ref 136–145)
TRIGL SERPL-MCNC: 89 MG/DL (ref 0–150)
TSH SERPL DL<=0.05 MIU/L-ACNC: 1.74 UIU/ML (ref 0.27–4.2)
VLDLC SERPL-MCNC: 16 MG/DL (ref 5–40)
WBC NRBC COR # BLD: 9.26 10*3/MM3 (ref 3.4–10.8)

## 2022-08-19 PROCEDURE — 82306 VITAMIN D 25 HYDROXY: CPT

## 2022-08-19 PROCEDURE — 83036 HEMOGLOBIN GLYCOSYLATED A1C: CPT

## 2022-08-19 PROCEDURE — 80050 GENERAL HEALTH PANEL: CPT

## 2022-08-19 PROCEDURE — 99213 OFFICE O/P EST LOW 20 MIN: CPT | Performed by: NURSE PRACTITIONER

## 2022-08-19 PROCEDURE — 80061 LIPID PANEL: CPT

## 2022-08-19 RX ORDER — HYDROXYZINE HYDROCHLORIDE 25 MG/1
25-50 TABLET, FILM COATED ORAL NIGHTLY PRN
Qty: 60 TABLET | Refills: 1 | Status: SHIPPED | OUTPATIENT
Start: 2022-08-19 | End: 2022-09-13 | Stop reason: SDUPTHER

## 2022-08-19 RX ORDER — FLUOXETINE HYDROCHLORIDE 20 MG/1
20 CAPSULE ORAL DAILY
Qty: 30 CAPSULE | Refills: 1 | Status: SHIPPED | OUTPATIENT
Start: 2022-08-19 | End: 2022-09-13 | Stop reason: SDUPTHER

## 2022-08-19 NOTE — PROGRESS NOTES
"Subjective   Neema Jett is a 26 y.o. female here to establish care.  Chief Complaint   Patient presents with   • Anxiety       History of Present Illness   Patient is here to establish care, complaint of anxiety and depression, started in high school, never treated.States she wants to improve her health, hopes to go back to college.  Has been gaining weight, 12 pounds in past 5 months.    Has 2 children an 9 yo and 12 yo  Has taken phentermine in the past; high intensity work out twice  a week and walks everyday, has been eating one meal a day.   The following portions of the patient's history were reviewed and updated as appropriate: allergies, current medications, past family history, past medical history, past social history, past surgical history and problem list.    Review of Systems   Constitutional: Positive for fatigue. Negative for appetite change, chills, diaphoresis, fever and unexpected weight change.   HENT: Negative for congestion, ear pain, hearing loss and trouble swallowing.    Eyes: Positive for visual disturbance (occ difficulty with distance vision).   Respiratory: Negative for shortness of breath.    Cardiovascular: Negative for chest pain.   Gastrointestinal: Negative for abdominal pain, constipation and diarrhea.   Genitourinary: Positive for menstrual problem (heavy periods). Negative for difficulty urinating and dysuria.   Musculoskeletal: Negative for arthralgias and back pain.   Neurological: Positive for headaches (once a week). Negative for dizziness and light-headedness.   Psychiatric/Behavioral: Positive for dysphoric mood and sleep disturbance. Negative for self-injury and suicidal ideas. The patient is nervous/anxious.      Blood pressure 132/86, pulse 94, height 149.9 cm (59.02\"), weight 79.7 kg (175 lb 12.8 oz), SpO2 99 %, not currently breastfeeding.    No Known Allergies  Past Medical History:   Diagnosis Date   • Anxiety    • Cholelithiasis    • Dislocation of right knee    • " Pap smear for cervical cancer screening 2016     Past Surgical History:   Procedure Laterality Date   • CHOLECYSTECTOMY  2016   • KNEE SURGERY Right    • PATELLAR TENDON GRAFT IMPLANTATION  2014   • VAGINAL DELIVERY      x2   • WISDOM TOOTH EXTRACTION       Family History   Problem Relation Age of Onset   • Diabetes Father    • Diabetes Maternal Grandmother    • Hypertension Maternal Grandmother    • Diabetes Paternal Grandmother      Social History     Socioeconomic History   • Marital status:    Tobacco Use   • Smoking status: Never Smoker   • Smokeless tobacco: Never Used   Vaping Use   • Vaping Use: Never used   Substance and Sexual Activity   • Alcohol use: Yes     Alcohol/week: 2.0 standard drinks     Types: 2 Cans of beer per week   • Drug use: No   • Sexual activity: Yes     Partners: Male     Birth control/protection: Condom     Immunization History   Administered Date(s) Administered   • COVID-19 (PFIZER) PURPLE CAP 05/26/2021, 10/20/2021   • Hepatitis A 04/30/2021   • Tdap 01/01/2014       Current Outpatient Medications:   •  FLUoxetine (PROzac) 20 MG capsule, Take 1 capsule by mouth Daily., Disp: 30 capsule, Rfl: 1  •  hydrOXYzine (ATARAX) 25 MG tablet, Take 1-2 tablets by mouth At Night As Needed for Anxiety (insomnia)., Disp: 60 tablet, Rfl: 1    Objective   Physical Exam  Vitals reviewed.   Constitutional:       General: She is not in acute distress.     Appearance: Normal appearance. She is obese. She is not ill-appearing, toxic-appearing or diaphoretic.   HENT:      Head: Normocephalic and atraumatic.   Cardiovascular:      Rate and Rhythm: Normal rate and regular rhythm.      Heart sounds: Normal heart sounds.   Pulmonary:      Effort: Pulmonary effort is normal. No respiratory distress.      Breath sounds: Normal breath sounds.   Neurological:      Mental Status: She is alert and oriented to person, place, and time.   Psychiatric:         Mood and Affect: Mood is anxious and depressed.          Behavior: Behavior normal.         Judgment: Judgment normal.         Assessment & Plan   Diagnoses and all orders for this visit:    1. Depression with anxiety (Primary)  -     FLUoxetine (PROzac) 20 MG capsule; Take 1 capsule by mouth Daily.  Dispense: 30 capsule; Refill: 1    2. Obesity (BMI 35.0-39.9 without comorbidity)    3. Healthcare maintenance  -     CBC Auto Differential; Future  -     Comprehensive Metabolic Panel; Future  -     TSH Rfx On Abnormal To Free T4; Future  -     Lipid Panel; Future    4. At risk for diabetes mellitus  -     Hemoglobin A1c; Future    5. Vitamin D deficiency  -     Vitamin D 25 Hydroxy; Future    6. Menorrhagia with regular cycle  -     Ambulatory Referral to Gynecology    7. Encounter for medical examination to establish care  -     Ambulatory Referral to Gynecology    8. Primary insomnia  -     hydrOXYzine (ATARAX) 25 MG tablet; Take 1-2 tablets by mouth At Night As Needed for Anxiety (insomnia).  Dispense: 60 tablet; Refill: 1      New Medications Ordered This Visit   Medications   • FLUoxetine (PROzac) 20 MG capsule     Sig: Take 1 capsule by mouth Daily.     Dispense:  30 capsule     Refill:  1   • hydrOXYzine (ATARAX) 25 MG tablet     Sig: Take 1-2 tablets by mouth At Night As Needed for Anxiety (insomnia).     Dispense:  60 tablet     Refill:  1   PHQ-9 Depression Screening  Little interest or pleasure in doing things? 2-->more than half the days   Feeling down, depressed, or hopeless? 1-->several days   Trouble falling or staying asleep, or sleeping too much? 3-->nearly every day   Feeling tired or having little energy? 3-->nearly every day   Poor appetite or overeating? 3-->nearly every day   Feeling bad about yourself - or that you are a failure or have let yourself or your family down? 3-->nearly every day   Trouble concentrating on things, such as reading the newspaper or watching television? 2-->more than half the days   Moving or speaking so slowly that other  people could have noticed? Or the opposite - being so fidgety or restless that you have been moving around a lot more than usual? 3-->nearly every day   Thoughts that you would be better off dead, or of hurting yourself in some way? 0-->not at all   PHQ-9 Total Score 20   If you checked off any problems, how difficult have these problems made it for you to do your work, take care of things at home, or get along with other people? somewhat difficult     ELFEGO 7 Score= 21  Start Prozac for depression and anxiety, patient understands she should notify me immediately if she has any adverse side effects, and that it may take 4 to 6 weeks to notice an improvement.  She will follow-up with me in 1 month.  Declines referral to behavioral health at this time   Provided information in her [employer] yajaira about managing depression and anxiety   also the Mediterranean diet and my plate from [employer]  Can consider restarting phentermine if lifestyle management is not effective  Nutrition and activity goals reviewed including: mainly water to drink, limit white flour/processed sugar, and processed foods, choose fresh fruits, vegetables, fish, lean meats,high fiber carbs, exercise  working toward 150 mins cardio per week, weight training 2x/week.  Screening labs ordered to evaluate chronic conditions. I will contact patient regarding test results and provide instructions regarding any necessary changes in plan of care.  Referred to gynecology for evaluation of heavy periods, PCOS is possibility  Hydroxyzine prescribed for insomnia  Patient was encouraged to keep me informed of any acute changes, lack of improvement, or any new concerning symptoms.

## 2022-09-13 ENCOUNTER — OFFICE VISIT (OUTPATIENT)
Dept: FAMILY MEDICINE CLINIC | Facility: CLINIC | Age: 27
End: 2022-09-13

## 2022-09-13 VITALS
OXYGEN SATURATION: 97 % | DIASTOLIC BLOOD PRESSURE: 88 MMHG | SYSTOLIC BLOOD PRESSURE: 120 MMHG | BODY MASS INDEX: 36.93 KG/M2 | HEIGHT: 59 IN | TEMPERATURE: 98.2 F | WEIGHT: 183.2 LBS | HEART RATE: 62 BPM

## 2022-09-13 DIAGNOSIS — Z00.00 ANNUAL PHYSICAL EXAM: Primary | ICD-10-CM

## 2022-09-13 DIAGNOSIS — E66.09 CLASS 2 OBESITY DUE TO EXCESS CALORIES WITHOUT SERIOUS COMORBIDITY WITH BODY MASS INDEX (BMI) OF 36.0 TO 36.9 IN ADULT: ICD-10-CM

## 2022-09-13 DIAGNOSIS — F41.8 DEPRESSION WITH ANXIETY: ICD-10-CM

## 2022-09-13 DIAGNOSIS — F51.01 PRIMARY INSOMNIA: ICD-10-CM

## 2022-09-13 DIAGNOSIS — Z51.81 THERAPEUTIC DRUG MONITORING: ICD-10-CM

## 2022-09-13 PROCEDURE — 3008F BODY MASS INDEX DOCD: CPT | Performed by: NURSE PRACTITIONER

## 2022-09-13 PROCEDURE — 99395 PREV VISIT EST AGE 18-39: CPT | Performed by: NURSE PRACTITIONER

## 2022-09-13 PROCEDURE — 2014F MENTAL STATUS ASSESS: CPT | Performed by: NURSE PRACTITIONER

## 2022-09-13 RX ORDER — PHENTERMINE HYDROCHLORIDE 37.5 MG/1
37.5 TABLET ORAL
Qty: 30 TABLET | Refills: 2 | Status: SHIPPED | OUTPATIENT
Start: 2022-09-13 | End: 2023-01-20 | Stop reason: SDUPTHER

## 2022-09-13 RX ORDER — FLUOXETINE HYDROCHLORIDE 20 MG/1
20 CAPSULE ORAL DAILY
Qty: 90 CAPSULE | Refills: 1 | Status: SHIPPED | OUTPATIENT
Start: 2022-09-13 | End: 2023-01-20 | Stop reason: SDUPTHER

## 2022-09-13 RX ORDER — HYDROXYZINE HYDROCHLORIDE 25 MG/1
25-50 TABLET, FILM COATED ORAL NIGHTLY PRN
Qty: 90 TABLET | Refills: 1 | Status: SHIPPED | OUTPATIENT
Start: 2022-09-13 | End: 2022-11-28

## 2022-09-13 NOTE — PATIENT INSTRUCTIONS
Monitor heart rate/pulse while taking phentermine; if stays above 100 will need to adjust the dose

## 2022-09-13 NOTE — PROGRESS NOTES
Patient Care Team:  Janie Liu APRN as PCP - General (Nurse Practitioner)     Chief complaint: Patient is in today for a physical     Patient is a 26 y.o. female who presents for her yearly physical exam.   Chief Complaint   Patient presents with   • Annual Exam   • Anxiety   • Depression   • Obesity     Pt. States she would like to discuss weight loss today if possible        HPI   Scheduled with GYN; states prozac is working well for depression and anxiety. She has noticed an increase in appetite, has taken phentermine in the past, wants to take that again since she did lose 20 pounds while taking the medication  Hydroxyzine helps with sleep  Review of Systems   Constitutional: Positive for fatigue. Negative for appetite change, chills, diaphoresis, fever and unexpected weight change.   HENT: Negative for congestion, ear pain, hearing loss and trouble swallowing.    Eyes: Positive for visual disturbance (occ difficulty with distance vision).   Respiratory: Negative for shortness of breath.    Cardiovascular: Negative for chest pain, palpitations and leg swelling.   Gastrointestinal: Negative for abdominal pain, blood in stool, constipation, diarrhea and nausea.   Genitourinary: Positive for menstrual problem (heavy periods). Negative for difficulty urinating and dysuria.   Musculoskeletal: Negative for arthralgias and back pain.   Skin: Negative for color change, pallor, rash and wound.   Neurological: Negative for dizziness, light-headedness and headaches.   Psychiatric/Behavioral: Positive for dysphoric mood (improved) and sleep disturbance (improved). Negative for self-injury and suicidal ideas. The patient is nervous/anxious (improved).       History  Past Medical History:   Diagnosis Date   • Anxiety    • Cholelithiasis    • Depression    • Dislocation of right knee    • Pap smear for cervical cancer screening 2016      Past Surgical History:   Procedure Laterality Date   • CHOLECYSTECTOMY  2016    • KNEE SURGERY Right    • PATELLAR TENDON GRAFT IMPLANTATION  2014   • VAGINAL DELIVERY      x2   • WISDOM TOOTH EXTRACTION        No Known Allergies   Family History   Problem Relation Age of Onset   • Hypertension Mother    • Diabetes Father    • Diabetes Maternal Grandmother    • Hypertension Maternal Grandmother    • Diabetes Paternal Grandmother      Social History     Socioeconomic History   • Marital status:    Tobacco Use   • Smoking status: Never Smoker   • Smokeless tobacco: Never Used   Vaping Use   • Vaping Use: Never used   Substance and Sexual Activity   • Alcohol use: Yes     Alcohol/week: 2.0 standard drinks     Types: 2 Cans of beer per week   • Drug use: No   • Sexual activity: Yes     Partners: Male     Birth control/protection: Condom        Current Outpatient Medications:   •  FLUoxetine (PROzac) 20 MG capsule, Take 1 capsule by mouth Daily., Disp: 90 capsule, Rfl: 1  •  hydrOXYzine (ATARAX) 25 MG tablet, Take 1-2 tablets by mouth At Night As Needed for Anxiety (insomnia)., Disp: 90 tablet, Rfl: 1  •  phentermine (ADIPEX-P) 37.5 MG tablet, Take 1 tablet by mouth Every Morning Before Breakfast., Disp: 30 tablet, Rfl: 2    Immunizations   N/A   Prescribed/Refused   Date     Td/Tdap  (Booster Q 10 yrs)   []           Prescribed    []     Refused        []           Flu  (Yearly)   []        Prescribed    []     Refused        []           Pneumovax  (1 yr after Prevnar)   []        Prescribed    []     Refused        []           Prevnar 13  (1 yr after Pneumo)   []        Prescribed    []     Refused        []           Hep B     []        Prescribed    []     Refused        []           Shingles  (Age 50 and older)   []        Prescribed    []     Refused        []           Immunization History   Administered Date(s) Administered   • COVID-19 (PFIZER) PURPLE CAP 05/26/2021, 10/20/2021   • Hepatitis A 04/30/2021   • Tdap 01/01/2014     Health Maintenance   Topic Date Due   • PAP SMEAR   01/01/2019   • ANNUAL PHYSICAL  02/16/2020   • COVID-19 Vaccine (3 - Booster for Pfizer series) 03/20/2022   • INFLUENZA VACCINE  10/01/2022   • TDAP/TD VACCINES (2 - Td or Tdap) 01/01/2024   • HEPATITIS C SCREENING  Completed   • Pneumococcal Vaccine 0-64  Aged Out        Diabetes  [] Yes  [] No   N/A      Date     Eye Exam     []             []   Complete     []   Recommended Date:  Where:       Foot Exam     []         []   Complete          Obesity Counseling     []       []   Complete       Hemoglobin A1C   Date Value Ref Range Status   08/19/2022 5.50 4.80 - 5.60 % Final       Additional Testing      Date     Colorectal Screening       []   N/A   []   Complete    []   Ordered     Date:    Where:       Pap      []   N/A   []   Complete   []   OB/GYN Date:    Where:       Mammogram        []   N/A   []   Complete   []  OB/GYN   []   Ordered Date:    Where:     PSA  (Over age 50)    []   N/A   []   Complete   []   Ordered Date:    Where:     US Aorta  (For male smokers, age 65)     []   N/A   []   Complete   []   Ordered Date:    Where:     CT for Smoker  (Age 55-75, 30 pk yr)    []   N/A   []   Complete   []   Ordered Date:    Where:     Bone Density/DEXA      []   N/A   []   Complete   []   Ordered Date:    Follow-up:     Hep. C        []   N/A   []   Complete   []   Ordered Date:    Where:     Results for orders placed or performed in visit on 08/19/22   CBC Auto Differential    Specimen: Blood   Result Value Ref Range    WBC 9.26 3.40 - 10.80 10*3/mm3    RBC 4.46 3.77 - 5.28 10*6/mm3    Hemoglobin 12.2 12.0 - 15.9 g/dL    Hematocrit 36.6 34.0 - 46.6 %    MCV 82.1 79.0 - 97.0 fL    MCH 27.4 26.6 - 33.0 pg    MCHC 33.3 31.5 - 35.7 g/dL    RDW 14.0 12.3 - 15.4 %    RDW-SD 41.2 37.0 - 54.0 fl    MPV 10.1 6.0 - 12.0 fL    Platelets 440 140 - 450 10*3/mm3    Neutrophil % 74.1 42.7 - 76.0 %    Lymphocyte % 17.4 (L) 19.6 - 45.3 %    Monocyte % 5.9 5.0 - 12.0 %    Eosinophil % 1.2 0.3 - 6.2 %    Basophil % 0.8 0.0 -  "1.5 %    Immature Grans % 0.6 (H) 0.0 - 0.5 %    Neutrophils, Absolute 6.86 1.70 - 7.00 10*3/mm3    Lymphocytes, Absolute 1.61 0.70 - 3.10 10*3/mm3    Monocytes, Absolute 0.55 0.10 - 0.90 10*3/mm3    Eosinophils, Absolute 0.11 0.00 - 0.40 10*3/mm3    Basophils, Absolute 0.07 0.00 - 0.20 10*3/mm3    Immature Grans, Absolute 0.06 (H) 0.00 - 0.05 10*3/mm3    nRBC 0.0 0.0 - 0.2 /100 WBC   Comprehensive Metabolic Panel    Specimen: Blood   Result Value Ref Range    Glucose 102 (H) 65 - 99 mg/dL    BUN 8 6 - 20 mg/dL    Creatinine 0.67 0.57 - 1.00 mg/dL    Sodium 140 136 - 145 mmol/L    Potassium 4.3 3.5 - 5.2 mmol/L    Chloride 103 98 - 107 mmol/L    CO2 28.0 22.0 - 29.0 mmol/L    Calcium 9.9 8.6 - 10.5 mg/dL    Total Protein 7.2 6.0 - 8.5 g/dL    Albumin 4.60 3.50 - 5.20 g/dL    ALT (SGPT) 18 1 - 33 U/L    AST (SGOT) 22 1 - 32 U/L    Alkaline Phosphatase 95 39 - 117 U/L    Total Bilirubin 0.5 0.0 - 1.2 mg/dL    Globulin 2.6 gm/dL    A/G Ratio 1.8 g/dL    BUN/Creatinine Ratio 11.9 7.0 - 25.0    Anion Gap 9.0 5.0 - 15.0 mmol/L    eGFR 123.0 >60.0 mL/min/1.73   Vitamin D 25 Hydroxy    Specimen: Blood   Result Value Ref Range    25 Hydroxy, Vitamin D 29.9 (L) 30.0 - 100.0 ng/ml   TSH Rfx On Abnormal To Free T4    Specimen: Blood   Result Value Ref Range    TSH 1.740 0.270 - 4.200 uIU/mL   Hemoglobin A1c    Specimen: Blood   Result Value Ref Range    Hemoglobin A1C 5.50 4.80 - 5.60 %   Lipid Panel    Specimen: Blood   Result Value Ref Range    Total Cholesterol 174 0 - 200 mg/dL    Triglycerides 89 0 - 150 mg/dL    HDL Cholesterol 54 40 - 60 mg/dL    LDL Cholesterol  104 (H) 0 - 100 mg/dL    VLDL Cholesterol 16 5 - 40 mg/dL    LDL/HDL Ratio 1.89             /88 (BP Location: Left arm, Patient Position: Sitting, Cuff Size: Adult)   Pulse 62   Temp 98.2 °F (36.8 °C)   Ht 149.9 cm (59.02\")   Wt 83.1 kg (183 lb 3.2 oz)   SpO2 97%   BMI 36.98 kg/m²       PHQ-9 Depression Screening  Little interest or pleasure in doing " things? 0-->not at all   Feeling down, depressed, or hopeless? 1-->several days   Trouble falling or staying asleep, or sleeping too much? 0-->not at all   Feeling tired or having little energy? 2-->more than half the days   Poor appetite or overeating? 3-->nearly every day   Feeling bad about yourself - or that you are a failure or have let yourself or your family down? 0-->not at all   Trouble concentrating on things, such as reading the newspaper or watching television? 0-->not at all   Moving or speaking so slowly that other people could have noticed? Or the opposite - being so fidgety or restless that you have been moving around a lot more than usual? 2-->more than half the days   Thoughts that you would be better off dead, or of hurting yourself in some way? 0-->not at all   PHQ-9 Total Score 8   If you checked off any problems, how difficult have these problems made it for you to do your work, take care of things at home, or get along with other people? somewhat difficult         Physical Exam  Vitals and nursing note reviewed.   Constitutional:       General: She is not in acute distress.     Appearance: Normal appearance. She is well-developed. She is not diaphoretic.   HENT:      Head: Normocephalic and atraumatic.      Right Ear: Tympanic membrane and external ear normal.      Left Ear: Tympanic membrane and external ear normal.      Nose: Nose normal.   Eyes:      General: No scleral icterus.        Right eye: No discharge.         Left eye: No discharge.      Conjunctiva/sclera: Conjunctivae normal.      Pupils: Pupils are equal, round, and reactive to light.   Neck:      Thyroid: No thyromegaly.      Vascular: No carotid bruit or JVD.      Trachea: No tracheal deviation.   Cardiovascular:      Rate and Rhythm: Normal rate and regular rhythm.      Heart sounds: Normal heart sounds. No murmur heard.    No friction rub. No gallop.   Pulmonary:      Effort: Pulmonary effort is normal. No respiratory  distress.      Breath sounds: Normal breath sounds. No stridor. No wheezing or rales.   Chest:      Chest wall: No tenderness.   Abdominal:      General: Bowel sounds are normal. There is no distension.      Palpations: Abdomen is soft. There is no mass.      Tenderness: There is no abdominal tenderness. There is no guarding or rebound.      Hernia: No hernia is present.   Musculoskeletal:         General: No tenderness or deformity. Normal range of motion.      Cervical back: Normal range of motion and neck supple.      Right lower leg: No edema.      Left lower leg: No edema.   Lymphadenopathy:      Cervical: No cervical adenopathy.   Skin:     General: Skin is warm and dry.      Coloration: Skin is not pale.      Findings: No erythema or rash.   Neurological:      Mental Status: She is alert and oriented to person, place, and time.      Cranial Nerves: No cranial nerve deficit.      Motor: No abnormal muscle tone.      Coordination: Coordination normal.      Deep Tendon Reflexes: Reflexes are normal and symmetric. Reflexes normal.   Psychiatric:         Behavior: Behavior normal.         Thought Content: Thought content normal.         Judgment: Judgment normal.             Counseling provided on diet and nutrition, exercise, weight management, mental health concerns and anxiety.    Diagnoses and all orders for this visit:    Annual physical exam    Class 2 obesity due to excess calories without serious comorbidity with body mass index (BMI) of 36.0 to 36.9 in adult  -     phentermine (ADIPEX-P) 37.5 MG tablet; Take 1 tablet by mouth Every Morning Before Breakfast.  -     Ambulatory Referral to Nutrition Services    Depression with anxiety  -     FLUoxetine (PROzac) 20 MG capsule; Take 1 capsule by mouth Daily.    Primary insomnia  -     hydrOXYzine (ATARAX) 25 MG tablet; Take 1-2 tablets by mouth At Night As Needed for Anxiety (insomnia).    Therapeutic drug monitoring  -     Compliance Drug Analysis, Ur -  Urine, Clean Catch; Future  -     Compliance Drug Analysis, Ur - Urine, Clean Catch       Reviewed lab test results that were done last month.  Vitamin D was low recommended 1000 units D3 supplement daily.  Blood cell counts kidney liver thyroid and total cholesterol within normal range.  .  Nutrition and activity goals reviewed including: mainly water to drink, limit white flour/processed sugar, and processed foods, choose fresh fruits, vegetables, fish, lean meats,high fiber carbs, exercise  working toward 150 mins cardio per week, weight training 2x/week.  Referred to dietitian for help with meal planning.  We will restart phentermine to help with weight loss.Monitor heart rate/pulse while taking phentermine; if stays above 100 will need to adjust the dose  This patient is on a controlled substance which improves symptoms/quality of life and is aware of the risks, benefits and possible side-effects current treatment. The patient denies any medication side-effects at this time. A controlled substance agreement will be obtained or is currently on file. We reviewed required monitoring for controlled substances including but not limited to quarterly follow-up visits, annual depression screening, and urine drug screens to which the patient is agreeable. A BENJAMIN report has been or shortly will be reviewed. There are no signs of deviation or misuse.   Depression and anxiety are controlled, continue current medications  Continue hydroxyzine for insomnia.    Janie Liu, JAGJIT   9/16/2022   04:55 EDT          Please note that portions of this document were completed with a voice recognition program.

## 2022-09-18 LAB — DRUGS UR: NORMAL

## 2022-11-27 DIAGNOSIS — F51.01 PRIMARY INSOMNIA: ICD-10-CM

## 2022-11-28 RX ORDER — HYDROXYZINE HYDROCHLORIDE 25 MG/1
TABLET, FILM COATED ORAL
Qty: 90 TABLET | Refills: 1 | Status: SHIPPED | OUTPATIENT
Start: 2022-11-28

## 2022-11-28 RX ORDER — HYDROXYZINE HYDROCHLORIDE 25 MG/1
TABLET, FILM COATED ORAL
Qty: 90 TABLET | Refills: 1 | OUTPATIENT
Start: 2022-11-28

## 2022-11-28 NOTE — TELEPHONE ENCOUNTER
Rx Refill Note  Requested Prescriptions     Pending Prescriptions Disp Refills   • hydrOXYzine (ATARAX) 25 MG tablet [Pharmacy Med Name: HYDROXYZINE HCL 25MG TABS (WHITE)] 90 tablet 1     Sig: TAKE 1 TO 2 TABLETS BY MOUTH AT NIGHT AS NEEDED FOR ANXIETY OR INSOMNIA      Last office visit with prescribing clinician: 9/13/2022      Next office visit with prescribing clinician: 11/27/2022            Jeane Land MA  11/28/22, 13:05 EST

## 2023-01-20 ENCOUNTER — OFFICE VISIT (OUTPATIENT)
Dept: FAMILY MEDICINE CLINIC | Facility: CLINIC | Age: 28
End: 2023-01-20
Payer: COMMERCIAL

## 2023-01-20 VITALS
HEIGHT: 59 IN | DIASTOLIC BLOOD PRESSURE: 70 MMHG | HEART RATE: 75 BPM | BODY MASS INDEX: 37.5 KG/M2 | OXYGEN SATURATION: 100 % | SYSTOLIC BLOOD PRESSURE: 116 MMHG | WEIGHT: 186 LBS

## 2023-01-20 DIAGNOSIS — F41.8 DEPRESSION WITH ANXIETY: Primary | ICD-10-CM

## 2023-01-20 DIAGNOSIS — Z23 FLU VACCINE NEED: ICD-10-CM

## 2023-01-20 DIAGNOSIS — E66.09 CLASS 2 OBESITY DUE TO EXCESS CALORIES WITHOUT SERIOUS COMORBIDITY WITH BODY MASS INDEX (BMI) OF 36.0 TO 36.9 IN ADULT: ICD-10-CM

## 2023-01-20 DIAGNOSIS — E66.9 OBESITY (BMI 35.0-39.9 WITHOUT COMORBIDITY): ICD-10-CM

## 2023-01-20 PROCEDURE — 90471 IMMUNIZATION ADMIN: CPT | Performed by: NURSE PRACTITIONER

## 2023-01-20 PROCEDURE — 90686 IIV4 VACC NO PRSV 0.5 ML IM: CPT | Performed by: NURSE PRACTITIONER

## 2023-01-20 PROCEDURE — 99214 OFFICE O/P EST MOD 30 MIN: CPT | Performed by: NURSE PRACTITIONER

## 2023-01-20 RX ORDER — PHENTERMINE HYDROCHLORIDE 37.5 MG/1
37.5 TABLET ORAL
Qty: 30 TABLET | Refills: 2 | Status: SHIPPED | OUTPATIENT
Start: 2023-01-20

## 2023-01-20 RX ORDER — FLUOXETINE HYDROCHLORIDE 20 MG/1
20 CAPSULE ORAL DAILY
Qty: 90 CAPSULE | Refills: 1 | Status: SHIPPED | OUTPATIENT
Start: 2023-01-20

## 2023-01-20 NOTE — PROGRESS NOTES
"Subjective   Neema Jett is a 27 y.o. female.   Chief Complaint   Patient presents with   • Anxiety     better   • Depression     better   • Contraception     Has been off for a while but has tried nexplanon and didn't like it       History of Present Illness   Patient is here for follow up for anxiety and depression, states she is felling \"much better\".   She is scheduled with GYN next month to discuss birth control options. Tried nexplanon 8 years ago, was \"raymond\" and gained weight, had removed after 6 months  Has been off phentermine for a month, wants new prescription    The following portions of the patient's history were reviewed and updated as appropriate: allergies, current medications, past family history, past medical history, past social history, past surgical history and problem list.    Review of Systems   Constitutional: Negative for chills, fatigue, fever and unexpected weight change.   Respiratory: Negative for shortness of breath.    Cardiovascular: Negative for chest pain.   Genitourinary: Positive for menstrual problem (heavy periods). Negative for difficulty urinating and dysuria.   Musculoskeletal: Positive for back pain (occ).   Skin: Negative for rash and wound.   Neurological: Negative for dizziness, light-headedness and headaches.   Psychiatric/Behavioral: Positive for sleep disturbance (improved with hydroxyzine). Negative for dysphoric mood (controlled), self-injury and suicidal ideas. The patient is not nervous/anxious (controlled).        Objective   Physical Exam  Vitals reviewed.   Constitutional:       General: She is not in acute distress.     Appearance: Normal appearance. She is obese. She is not ill-appearing, toxic-appearing or diaphoretic.   HENT:      Head: Normocephalic and atraumatic.   Cardiovascular:      Rate and Rhythm: Normal rate and regular rhythm.      Heart sounds: Normal heart sounds.   Pulmonary:      Effort: Pulmonary effort is normal. No respiratory distress. " "     Breath sounds: Normal breath sounds.   Neurological:      Mental Status: She is alert.   Psychiatric:         Mood and Affect: Mood normal.         Thought Content: Thought content normal.         Judgment: Judgment normal.       /70   Pulse 75   Ht 149.9 cm (59.02\")   Wt 84.4 kg (186 lb)   SpO2 100%   BMI 37.54 kg/m²     Assessment & Plan   Diagnoses and all orders for this visit:    1. Depression with anxiety (Primary)    2. Flu vaccine need  -     FluLaval/Fluzone >6 mos ([id number])    3. Obesity (BMI 35.0-39.9 without comorbidity)  -     Ambulatory Referral to Nutrition Services    4. Class 2 obesity due to excess calories without serious comorbidity with body mass index (BMI) of 36.0 to 36.9 in adult  -     phentermine (ADIPEX-P) 37.5 MG tablet; Take 1 tablet by mouth Every Morning Before Breakfast.  Dispense: 30 tablet; Refill: 2        Depression and anxiety are controlled, continue current medications  New referral to see dietician ordered, discussed importance of lifestyle changes with diet and exercise to help with weight loss.   Reminded patient she must be seen every 3 months for phentermine refills, if no improvement in weight at next visit will plan to discontinue. Patient will check with insurance about Saxenda and Wegovy  Nutrition and activity goals reviewed including: mainly water to drink, limit white flour/processed sugar, and processed foods, choose fresh fruits, vegetables, fish, lean meats,high fiber carbs, exercise  working toward 150 mins cardio per week, weight training 2x/week.  Follow up with GYN as scheduled for heavy periods and contraception options  Patient was encouraged to keep me informed of any acute changes, lack of improvement, or any new concerning symptoms.  This patient is on a controlled substance which improves symptoms/quality of life and is aware of the risks, benefits and possible side-effects current treatment. The patient denies any medication " side-effects at this time. A controlled substance agreement will be obtained or is currently on file. We reviewed required monitoring for controlled substances including but not limited to quarterly follow-up visits, annual depression screening, and urine drug screens to which the patient is agreeable. A BENJAMIN report has been or shortly will be reviewed. There are no signs of deviation or misuse.

## 2023-09-23 ENCOUNTER — OFFICE VISIT (OUTPATIENT)
Dept: FAMILY MEDICINE CLINIC | Facility: CLINIC | Age: 28
End: 2023-09-23
Payer: COMMERCIAL

## 2023-09-23 ENCOUNTER — LAB (OUTPATIENT)
Dept: LAB | Facility: HOSPITAL | Age: 28
End: 2023-09-23
Payer: COMMERCIAL

## 2023-09-23 VITALS
OXYGEN SATURATION: 99 % | HEIGHT: 59 IN | BODY MASS INDEX: 40.48 KG/M2 | SYSTOLIC BLOOD PRESSURE: 120 MMHG | HEART RATE: 84 BPM | TEMPERATURE: 98.3 F | DIASTOLIC BLOOD PRESSURE: 80 MMHG | WEIGHT: 200.8 LBS

## 2023-09-23 DIAGNOSIS — E55.9 VITAMIN D DEFICIENCY: ICD-10-CM

## 2023-09-23 DIAGNOSIS — D50.0 IRON DEFICIENCY ANEMIA DUE TO CHRONIC BLOOD LOSS: ICD-10-CM

## 2023-09-23 DIAGNOSIS — N89.8 VAGINAL DISCHARGE: ICD-10-CM

## 2023-09-23 DIAGNOSIS — Z00.00 HEALTHCARE MAINTENANCE: Primary | ICD-10-CM

## 2023-09-23 DIAGNOSIS — R82.90 ABNORMAL URINE ODOR: ICD-10-CM

## 2023-09-23 DIAGNOSIS — R25.2 MUSCLE CRAMPS: ICD-10-CM

## 2023-09-23 DIAGNOSIS — F51.01 PRIMARY INSOMNIA: ICD-10-CM

## 2023-09-23 DIAGNOSIS — E66.01 CLASS 3 SEVERE OBESITY DUE TO EXCESS CALORIES WITH SERIOUS COMORBIDITY AND BODY MASS INDEX (BMI) OF 40.0 TO 44.9 IN ADULT: ICD-10-CM

## 2023-09-23 DIAGNOSIS — N92.1 MENORRHAGIA WITH IRREGULAR CYCLE: ICD-10-CM

## 2023-09-23 DIAGNOSIS — Z00.00 HEALTHCARE MAINTENANCE: ICD-10-CM

## 2023-09-23 DIAGNOSIS — M54.50 ACUTE BILATERAL LOW BACK PAIN WITHOUT SCIATICA: ICD-10-CM

## 2023-09-23 DIAGNOSIS — F41.8 DEPRESSION WITH ANXIETY: ICD-10-CM

## 2023-09-23 LAB
25(OH)D3 SERPL-MCNC: 16 NG/ML (ref 30–100)
ALBUMIN SERPL-MCNC: 4.2 G/DL (ref 3.5–5.2)
ALBUMIN/GLOB SERPL: 1.4 G/DL
ALP SERPL-CCNC: 108 U/L (ref 39–117)
ALT SERPL W P-5'-P-CCNC: 37 U/L (ref 1–33)
ANION GAP SERPL CALCULATED.3IONS-SCNC: 11 MMOL/L (ref 5–15)
AST SERPL-CCNC: 35 U/L (ref 1–32)
BASOPHILS # BLD AUTO: 0.07 10*3/MM3 (ref 0–0.2)
BASOPHILS NFR BLD AUTO: 0.9 % (ref 0–1.5)
BILIRUB SERPL-MCNC: 0.3 MG/DL (ref 0–1.2)
BUN SERPL-MCNC: 9 MG/DL (ref 6–20)
BUN/CREAT SERPL: 13.2 (ref 7–25)
CALCIUM SPEC-SCNC: 9.4 MG/DL (ref 8.6–10.5)
CHLORIDE SERPL-SCNC: 102 MMOL/L (ref 98–107)
CHOLEST SERPL-MCNC: 170 MG/DL (ref 0–200)
CO2 SERPL-SCNC: 26 MMOL/L (ref 22–29)
CREAT SERPL-MCNC: 0.68 MG/DL (ref 0.57–1)
DEPRECATED RDW RBC AUTO: 43.5 FL (ref 37–54)
EGFRCR SERPLBLD CKD-EPI 2021: 121.8 ML/MIN/1.73
EOSINOPHIL # BLD AUTO: 0.28 10*3/MM3 (ref 0–0.4)
EOSINOPHIL NFR BLD AUTO: 3.4 % (ref 0.3–6.2)
ERYTHROCYTE [DISTWIDTH] IN BLOOD BY AUTOMATED COUNT: 15 % (ref 12.3–15.4)
FERRITIN SERPL-MCNC: 19.7 NG/ML (ref 13–150)
GLOBULIN UR ELPH-MCNC: 3 GM/DL
GLUCOSE SERPL-MCNC: 79 MG/DL (ref 65–99)
HBA1C MFR BLD: 5.9 % (ref 4.8–5.6)
HCT VFR BLD AUTO: 33.5 % (ref 34–46.6)
HDLC SERPL-MCNC: 38 MG/DL (ref 40–60)
HGB BLD-MCNC: 10.8 G/DL (ref 12–15.9)
IMM GRANULOCYTES # BLD AUTO: 0.05 10*3/MM3 (ref 0–0.05)
IMM GRANULOCYTES NFR BLD AUTO: 0.6 % (ref 0–0.5)
IRON 24H UR-MRATE: 35 MCG/DL (ref 37–145)
IRON SATN MFR SERPL: 7 % (ref 20–50)
LDLC SERPL CALC-MCNC: 102 MG/DL (ref 0–100)
LDLC/HDLC SERPL: 2.58 {RATIO}
LYMPHOCYTES # BLD AUTO: 2.26 10*3/MM3 (ref 0.7–3.1)
LYMPHOCYTES NFR BLD AUTO: 27.5 % (ref 19.6–45.3)
MAGNESIUM SERPL-MCNC: 2.7 MG/DL (ref 1.6–2.6)
MCH RBC QN AUTO: 25.6 PG (ref 26.6–33)
MCHC RBC AUTO-ENTMCNC: 32.2 G/DL (ref 31.5–35.7)
MCV RBC AUTO: 79.4 FL (ref 79–97)
MONOCYTES # BLD AUTO: 0.52 10*3/MM3 (ref 0.1–0.9)
MONOCYTES NFR BLD AUTO: 6.3 % (ref 5–12)
NEUTROPHILS NFR BLD AUTO: 5.04 10*3/MM3 (ref 1.7–7)
NEUTROPHILS NFR BLD AUTO: 61.3 % (ref 42.7–76)
NRBC BLD AUTO-RTO: 0 /100 WBC (ref 0–0.2)
PLATELET # BLD AUTO: 443 10*3/MM3 (ref 140–450)
PMV BLD AUTO: 9.7 FL (ref 6–12)
POTASSIUM SERPL-SCNC: 4 MMOL/L (ref 3.5–5.2)
PROT SERPL-MCNC: 7.2 G/DL (ref 6–8.5)
RBC # BLD AUTO: 4.22 10*6/MM3 (ref 3.77–5.28)
SODIUM SERPL-SCNC: 139 MMOL/L (ref 136–145)
TIBC SERPL-MCNC: 519 MCG/DL (ref 298–536)
TRANSFERRIN SERPL-MCNC: 348 MG/DL (ref 200–360)
TRIGL SERPL-MCNC: 170 MG/DL (ref 0–150)
TSH SERPL DL<=0.05 MIU/L-ACNC: 2.09 UIU/ML (ref 0.27–4.2)
VLDLC SERPL-MCNC: 30 MG/DL (ref 5–40)
WBC NRBC COR # BLD: 8.22 10*3/MM3 (ref 3.4–10.8)

## 2023-09-23 PROCEDURE — 83036 HEMOGLOBIN GLYCOSYLATED A1C: CPT

## 2023-09-23 PROCEDURE — 87801 DETECT AGNT MULT DNA AMPLI: CPT | Performed by: NURSE PRACTITIONER

## 2023-09-23 PROCEDURE — 82306 VITAMIN D 25 HYDROXY: CPT

## 2023-09-23 PROCEDURE — 87798 DETECT AGENT NOS DNA AMP: CPT | Performed by: NURSE PRACTITIONER

## 2023-09-23 PROCEDURE — 87661 TRICHOMONAS VAGINALIS AMPLIF: CPT | Performed by: NURSE PRACTITIONER

## 2023-09-23 PROCEDURE — 83735 ASSAY OF MAGNESIUM: CPT

## 2023-09-23 PROCEDURE — 80061 LIPID PANEL: CPT

## 2023-09-23 PROCEDURE — 82728 ASSAY OF FERRITIN: CPT

## 2023-09-23 PROCEDURE — 87491 CHLMYD TRACH DNA AMP PROBE: CPT | Performed by: NURSE PRACTITIONER

## 2023-09-23 PROCEDURE — 83540 ASSAY OF IRON: CPT

## 2023-09-23 PROCEDURE — 87591 N.GONORRHOEAE DNA AMP PROB: CPT | Performed by: NURSE PRACTITIONER

## 2023-09-23 PROCEDURE — 80050 GENERAL HEALTH PANEL: CPT

## 2023-09-23 PROCEDURE — 84466 ASSAY OF TRANSFERRIN: CPT

## 2023-09-23 RX ORDER — HYDROXYZINE HYDROCHLORIDE 25 MG/1
25 TABLET, FILM COATED ORAL EVERY 4 HOURS PRN
Qty: 90 TABLET | Refills: 1 | Status: SHIPPED | OUTPATIENT
Start: 2023-09-23

## 2023-09-23 RX ORDER — FLUOXETINE HYDROCHLORIDE 20 MG/1
20 CAPSULE ORAL DAILY
Qty: 90 CAPSULE | Refills: 1 | Status: SHIPPED | OUTPATIENT
Start: 2023-09-23

## 2023-09-23 NOTE — PROGRESS NOTES
Subjective   Neema Jett is a 27 y.o. female.   Chief Complaint   Patient presents with    Back Pain     Pt. States she noticed about a week ago. Pt. States she has noticed it feels better since Tuesday.    Menstrual Problem       History of Present Illness   Patient is here with complaint of low back pain since Tuesday. Denies heavy lifting or injury. She had trouble getting out of bed and bending at first. Noticed improvement on Thursday, took tylenol and ibuprofen which helped.  Still having heavy and irregular periods, starts as a little blood, then gets heavier, goes through a pad every 1.5 hours for 3 days, passes clots on some days. She was scheduled with GYN in Feb, but did not go to the appt.  Previously on phentermine, she did not follow up every 3 months for refills, states she has been gaining weight again, Family hx of diabetes. Admits that the phentermine did increase her anxiety also.  States she noted urine odor and vaginal discharge a week or so ago, has resolved, but concerned about possible yeast infection  The following portions of the patient's history were reviewed and updated as appropriate: allergies, current medications, past family history, past medical history, past social history, past surgical history, and problem list.    Review of Systems   Constitutional:  Positive for fatigue. Negative for chills and fever.   Eyes:  Positive for visual disturbance (will see eye dr).   Respiratory:  Positive for shortness of breath (with steps). Negative for cough and wheezing.    Cardiovascular:  Positive for leg swelling (occ). Negative for chest pain and palpitations.   Gastrointestinal:  Negative for abdominal pain, constipation and diarrhea.   Endocrine: Positive for polydipsia and polyuria.   Genitourinary:  Positive for menstrual problem. Negative for difficulty urinating and dysuria.   Musculoskeletal:  Positive for arthralgias, back pain and myalgias (leg cramps at night occ).  "  Neurological:  Negative for dizziness, light-headedness, numbness and headaches.   Psychiatric/Behavioral:  Positive for dysphoric mood (during periods). Negative for sleep disturbance. The patient is nervous/anxious (occ).      Objective   Physical Exam  Vitals reviewed.   Constitutional:       General: She is not in acute distress.     Appearance: Normal appearance. She is obese. She is not ill-appearing, toxic-appearing or diaphoretic.   HENT:      Head: Normocephalic and atraumatic.   Cardiovascular:      Rate and Rhythm: Normal rate and regular rhythm.      Heart sounds: Normal heart sounds.   Pulmonary:      Effort: Pulmonary effort is normal. No respiratory distress.      Breath sounds: Normal breath sounds.   Musculoskeletal:      Right lower leg: No edema.      Left lower leg: No edema.   Neurological:      Mental Status: She is alert.   Psychiatric:         Mood and Affect: Mood normal.         Thought Content: Thought content normal.         Judgment: Judgment normal.     /80 (BP Location: Right arm, Patient Position: Sitting, Cuff Size: Adult)   Pulse 84   Temp 98.3 °F (36.8 °C) (Oral)   Ht 149.9 cm (59.02\")   Wt 91.1 kg (200 lb 12.8 oz)   SpO2 99%   BMI 40.53 kg/m²     Assessment & Plan   Problems Addressed this Visit    None  Visit Diagnoses       Healthcare maintenance    -  Primary    Relevant Orders    CBC Auto Differential    Comprehensive Metabolic Panel    Lipid Panel    TSH Rfx On Abnormal To Free T4    Primary insomnia        Relevant Medications    hydrOXYzine (ATARAX) 25 MG tablet    Depression with anxiety        Relevant Medications    hydrOXYzine (ATARAX) 25 MG tablet    FLUoxetine (PROzac) 20 MG capsule    Menorrhagia with irregular cycle        Relevant Orders    Iron Profile    Ferritin    Class 3 severe obesity due to excess calories with serious comorbidity and body mass index (BMI) of 40.0 to 44.9 in adult        Relevant Orders    Hemoglobin A1c    Vitamin D deficiency "        Relevant Orders    Vitamin D,25-Hydroxy    Muscle cramps        Relevant Orders    Magnesium    Abnormal urine odor        Relevant Orders    Urinalysis With Culture If Indicated - Urine, Clean Catch    Vaginal discharge        Relevant Orders    NuSwab VG+ - Swab, Vagina    Acute bilateral low back pain without sciatica              Diagnoses         Codes Comments    Healthcare maintenance    -  Primary ICD-10-CM: Z00.00  ICD-9-CM: V70.0     Primary insomnia     ICD-10-CM: F51.01  ICD-9-CM: 307.42     Depression with anxiety     ICD-10-CM: F41.8  ICD-9-CM: 300.4     Menorrhagia with irregular cycle     ICD-10-CM: N92.1  ICD-9-CM: 626.2     Class 3 severe obesity due to excess calories with serious comorbidity and body mass index (BMI) of 40.0 to 44.9 in adult     ICD-10-CM: E66.01, Z68.41  ICD-9-CM: 278.01, V85.41     Vitamin D deficiency     ICD-10-CM: E55.9  ICD-9-CM: 268.9     Muscle cramps     ICD-10-CM: R25.2  ICD-9-CM: 729.82     Abnormal urine odor     ICD-10-CM: R82.90  ICD-9-CM: 791.9     Vaginal discharge     ICD-10-CM: N89.8  ICD-9-CM: 623.5     Acute bilateral low back pain without sciatica     ICD-10-CM: M54.50  ICD-9-CM: 724.2, 338.19             Patient's (Body mass index is 40.53 kg/m².) indicates that they are morbidly obese (BMI > 40 or > 35 with obesity - related health condition) with obesity-related health conditions that include none . Obesity is worsening. BMI is is above average; BMI management plan is completed. We discussed low calorie, low carb based diet program, portion control, increasing exercise, and management of depression/anxiety/stress to control compensatory eating.     Diet information provided; did not refill phentermine due to noncompliance and side effects  Provided patient with name of GYN she had been referred to, she agrees to schedule appointment  Screening labs ordered to evaluate chronic conditions. I will contact patient regarding test results and provide  instructions regarding any necessary changes in plan of care.  Back pain resolved; provided information on back exercises  Patient was encouraged to keep me informed of any acute changes, lack of improvement, or any new concerning symptoms.

## 2023-09-25 RX ORDER — FERROUS SULFATE 325(65) MG
325 TABLET ORAL
Qty: 90 TABLET | Refills: 1 | Status: SHIPPED | OUTPATIENT
Start: 2023-09-25

## 2023-09-27 LAB
A VAGINAE DNA VAG QL NAA+PROBE: NORMAL SCORE
BVAB2 DNA VAG QL NAA+PROBE: NORMAL SCORE
C ALBICANS DNA VAG QL NAA+PROBE: NEGATIVE
C GLABRATA DNA VAG QL NAA+PROBE: NEGATIVE
C TRACH DNA VAG QL NAA+PROBE: NEGATIVE
MEGA1 DNA VAG QL NAA+PROBE: NORMAL SCORE
N GONORRHOEA DNA VAG QL NAA+PROBE: NEGATIVE
T VAGINALIS DNA VAG QL NAA+PROBE: NEGATIVE

## 2023-10-03 ENCOUNTER — TELEPHONE (OUTPATIENT)
Dept: FAMILY MEDICINE CLINIC | Facility: CLINIC | Age: 28
End: 2023-10-03
Payer: COMMERCIAL

## 2023-10-03 NOTE — TELEPHONE ENCOUNTER
Caller: Neema Jett    Relationship: Self    Best call back number: [phone]     What medications are you currently taking:   Current Outpatient Medications on File Prior to Visit   Medication Sig Dispense Refill    ferrous sulfate (FerrouSul) 325 (65 FE) MG tablet Take 1 tablet by mouth Daily With Breakfast. 90 tablet 1    FLUoxetine (PROzac) 20 MG capsule Take 1 capsule by mouth Daily. 90 capsule 1    hydrOXYzine (ATARAX) 25 MG tablet Take 1 tablet by mouth Every 4 (Four) Hours As Needed for Anxiety (insomnia). 90 tablet 1    vitamin D3 125 MCG (5000 UT) capsule capsule Take 1 capsule by mouth Daily. 90 capsule 3     No current facility-administered medications on file prior to visit.          When did you start taking these medications:     Which medication are you concerned about: vitamin D3 125 MCG (5000 UT) capsule capsule AND ferrous sulfate (FerrouSul) 325 (65 FE) MG tablet    Who prescribed you this medication:     What are your concerns: PATIENT STATES SHE HAS BEEN TRYING TO GET THESE MEDICATIONS FILLED BUT THE PHARMACY IS HAVING SOME PROBLEMS GETTING THEM FILLED SO SHE WOULD LIKE TO KNOW IF THESE MEDICATIONS ARE COVERED BY HER INSURANCE AND IF THEY ARE NOT CAN SHE BE PRESCRIBED A DIFFERENT MEDICATION.     How long have you had these concerns:

## 2023-10-03 NOTE — TELEPHONE ENCOUNTER
Attempted to contact patient, no answer. LVM with office # given.     I spoke with patients pharmacy. This is an insurance problem that the patient needs to call her insurance to solve. Her insurance is telling the pharmacy that she has another primary insurance and the pharmacy stated they explained to the patient she has to call her insurance to get this straightened out with them. If she does not have another primary insurance she will have to update this with her current insurance plan. If she has any confusion she can contact the pharmacy for more information.      Hub may relay message and document.

## 2023-10-04 NOTE — TELEPHONE ENCOUNTER
2nd attempt    Attempted to contact patient, no answer. LVM with office # given.      I spoke with patients pharmacy. This is an insurance problem that the patient needs to call her insurance to solve. Her insurance is telling the pharmacy that she has another primary insurance and the pharmacy stated they explained to the patient she has to call her insurance to get this straightened out with them. If she does not have another primary insurance she will have to update this with her current insurance plan. If she has any confusion she can contact the pharmacy for more information.        Hub may relay message and document.

## 2024-01-22 ENCOUNTER — LAB (OUTPATIENT)
Dept: LAB | Facility: HOSPITAL | Age: 29
End: 2024-01-22
Payer: COMMERCIAL

## 2024-01-22 ENCOUNTER — TRANSCRIBE ORDERS (OUTPATIENT)
Dept: LAB | Facility: HOSPITAL | Age: 29
End: 2024-01-22
Payer: COMMERCIAL

## 2024-01-22 DIAGNOSIS — N92.6 IRREGULAR MENSTRUAL CYCLE: Primary | ICD-10-CM

## 2024-01-22 DIAGNOSIS — N92.6 IRREGULAR MENSTRUAL CYCLE: ICD-10-CM

## 2024-01-22 LAB
HCG INTACT+B SERPL-ACNC: NORMAL MIU/ML
PROGEST SERPL-MCNC: 37.6 NG/ML

## 2024-01-22 PROCEDURE — 84144 ASSAY OF PROGESTERONE: CPT

## 2024-01-22 PROCEDURE — 84702 CHORIONIC GONADOTROPIN TEST: CPT

## 2024-01-22 PROCEDURE — 36415 COLL VENOUS BLD VENIPUNCTURE: CPT

## 2024-02-07 ENCOUNTER — TRANSCRIBE ORDERS (OUTPATIENT)
Dept: LAB | Facility: HOSPITAL | Age: 29
End: 2024-02-07
Payer: COMMERCIAL

## 2024-02-07 ENCOUNTER — LAB (OUTPATIENT)
Dept: LAB | Facility: HOSPITAL | Age: 29
End: 2024-02-07
Payer: COMMERCIAL

## 2024-02-07 DIAGNOSIS — Z3A.17 17 WEEKS GESTATION OF PREGNANCY: Primary | ICD-10-CM

## 2024-02-07 DIAGNOSIS — Z34.92 SECOND TRIMESTER PREGNANCY: ICD-10-CM

## 2024-02-07 DIAGNOSIS — Z3A.17 17 WEEKS GESTATION OF PREGNANCY: ICD-10-CM

## 2024-02-07 LAB
ABO GROUP BLD: NORMAL
AMPHET+METHAMPHET UR QL: NEGATIVE
AMPHETAMINES UR QL: NEGATIVE
BARBITURATES UR QL SCN: NEGATIVE
BENZODIAZ UR QL SCN: NEGATIVE
BILIRUB UR QL STRIP: NEGATIVE
BLD GP AB SCN SERPL QL: NEGATIVE
BUPRENORPHINE SERPL-MCNC: NEGATIVE NG/ML
CANNABINOIDS SERPL QL: NEGATIVE
CLARITY UR: ABNORMAL
COCAINE UR QL: NEGATIVE
COLOR UR: YELLOW
DEPRECATED RDW RBC AUTO: 50 FL (ref 37–54)
ERYTHROCYTE [DISTWIDTH] IN BLOOD BY AUTOMATED COUNT: 17.2 % (ref 12.3–15.4)
FENTANYL UR-MCNC: NEGATIVE NG/ML
GLUCOSE UR STRIP-MCNC: ABNORMAL MG/DL
HCT VFR BLD AUTO: 29.6 % (ref 34–46.6)
HGB BLD-MCNC: 9.7 G/DL (ref 12–15.9)
HGB UR QL STRIP.AUTO: NEGATIVE
HOLD SPECIMEN: NORMAL
KETONES UR QL STRIP: ABNORMAL
LEUKOCYTE ESTERASE UR QL STRIP.AUTO: NEGATIVE
MCH RBC QN AUTO: 26.4 PG (ref 26.6–33)
MCHC RBC AUTO-ENTMCNC: 32.8 G/DL (ref 31.5–35.7)
MCV RBC AUTO: 80.4 FL (ref 79–97)
METHADONE UR QL SCN: NEGATIVE
NITRITE UR QL STRIP: NEGATIVE
OPIATES UR QL: NEGATIVE
OXYCODONE UR QL SCN: NEGATIVE
PCP UR QL SCN: NEGATIVE
PH UR STRIP.AUTO: 8.5 [PH] (ref 5–8)
PLATELET # BLD AUTO: 340 10*3/MM3 (ref 140–450)
PMV BLD AUTO: 9.7 FL (ref 6–12)
PROT UR QL STRIP: ABNORMAL
RBC # BLD AUTO: 3.68 10*6/MM3 (ref 3.77–5.28)
RH BLD: POSITIVE
SP GR UR STRIP: 1.02 (ref 1–1.03)
TRICYCLICS UR QL SCN: NEGATIVE
UROBILINOGEN UR QL STRIP: ABNORMAL
WBC NRBC COR # BLD AUTO: 9.85 10*3/MM3 (ref 3.4–10.8)

## 2024-02-07 PROCEDURE — 82947 ASSAY GLUCOSE BLOOD QUANT: CPT

## 2024-02-07 PROCEDURE — 85027 COMPLETE CBC AUTOMATED: CPT

## 2024-02-07 PROCEDURE — 86780 TREPONEMA PALLIDUM: CPT

## 2024-02-07 PROCEDURE — 87340 HEPATITIS B SURFACE AG IA: CPT

## 2024-02-07 PROCEDURE — 80307 DRUG TEST PRSMV CHEM ANLYZR: CPT

## 2024-02-07 PROCEDURE — 87491 CHLMYD TRACH DNA AMP PROBE: CPT

## 2024-02-07 PROCEDURE — 81003 URINALYSIS AUTO W/O SCOPE: CPT

## 2024-02-07 PROCEDURE — 86850 RBC ANTIBODY SCREEN: CPT

## 2024-02-07 PROCEDURE — 86901 BLOOD TYPING SEROLOGIC RH(D): CPT

## 2024-02-07 PROCEDURE — 83036 HEMOGLOBIN GLYCOSYLATED A1C: CPT

## 2024-02-07 PROCEDURE — 87591 N.GONORRHOEAE DNA AMP PROB: CPT

## 2024-02-07 PROCEDURE — 87086 URINE CULTURE/COLONY COUNT: CPT

## 2024-02-07 PROCEDURE — 86803 HEPATITIS C AB TEST: CPT

## 2024-02-07 PROCEDURE — 36415 COLL VENOUS BLD VENIPUNCTURE: CPT

## 2024-02-07 PROCEDURE — G0432 EIA HIV-1/HIV-2 SCREEN: HCPCS

## 2024-02-07 PROCEDURE — 86762 RUBELLA ANTIBODY: CPT

## 2024-02-07 PROCEDURE — 86900 BLOOD TYPING SEROLOGIC ABO: CPT

## 2024-02-08 LAB
BACTERIA SPEC AEROBE CULT: NORMAL
GLUCOSE SERPL-MCNC: 86 MG/DL (ref 65–99)
HBA1C MFR BLD: 5.7 % (ref 4.8–5.6)
HBV SURFACE AG SERPL QL IA: NORMAL
HCV AB SER DONR QL: NORMAL
HIV 1+2 AB+HIV1 P24 AG SERPL QL IA: NORMAL

## 2024-02-09 LAB — RUBV IGG SERPL IA-ACNC: 1.7 INDEX

## 2024-02-12 LAB — TREPONEMA PALLIDUM IGG+IGM AB [PRESENCE] IN SERUM OR PLASMA BY IMMUNOASSAY: NON REACTIVE

## 2024-02-13 LAB
C TRACH RRNA SPEC QL NAA+PROBE: NEGATIVE
N GONORRHOEA RRNA SPEC QL NAA+PROBE: NEGATIVE

## 2024-03-26 ENCOUNTER — LAB (OUTPATIENT)
Dept: LAB | Facility: HOSPITAL | Age: 29
End: 2024-03-26
Payer: COMMERCIAL

## 2024-03-26 ENCOUNTER — TRANSCRIBE ORDERS (OUTPATIENT)
Dept: LAB | Facility: HOSPITAL | Age: 29
End: 2024-03-26
Payer: COMMERCIAL

## 2024-03-26 DIAGNOSIS — O30.049 DICHORIONIC DIAMNIOTIC TWIN PREGNANCY, ANTEPARTUM: ICD-10-CM

## 2024-03-26 DIAGNOSIS — O30.049 DICHORIONIC DIAMNIOTIC TWIN PREGNANCY, ANTEPARTUM: Primary | ICD-10-CM

## 2024-03-26 LAB — GLUCOSE 1H P 100 G GLC PO SERPL-MCNC: 152 MG/DL (ref 65–139)

## 2024-03-26 PROCEDURE — 82950 GLUCOSE TEST: CPT

## 2024-03-26 PROCEDURE — 36415 COLL VENOUS BLD VENIPUNCTURE: CPT

## 2024-03-26 PROCEDURE — 82948 REAGENT STRIP/BLOOD GLUCOSE: CPT | Performed by: ADVANCED PRACTICE MIDWIFE

## 2024-04-05 ENCOUNTER — LAB (OUTPATIENT)
Dept: LAB | Facility: HOSPITAL | Age: 29
End: 2024-04-05
Payer: COMMERCIAL

## 2024-04-05 ENCOUNTER — TRANSCRIBE ORDERS (OUTPATIENT)
Dept: LAB | Facility: HOSPITAL | Age: 29
End: 2024-04-05
Payer: COMMERCIAL

## 2024-04-05 DIAGNOSIS — O99.810 ABNORMAL MATERNAL GLUCOSE TOLERANCE, ANTEPARTUM: Primary | ICD-10-CM

## 2024-04-05 DIAGNOSIS — O99.810 ABNORMAL MATERNAL GLUCOSE TOLERANCE, ANTEPARTUM: ICD-10-CM

## 2024-04-05 LAB
GLUCOSE 1H P 100 G GLC PO SERPL-MCNC: 214 MG/DL (ref 74–180)
GLUCOSE 2H P 100 G GLC PO SERPL-MCNC: 127 MG/DL (ref 74–155)
GLUCOSE 3H P 100 G GLC PO SERPL-MCNC: 100 MG/DL (ref 74–140)
GLUCOSE P FAST SERPL-MCNC: 96 MG/DL (ref 74–106)

## 2024-04-05 PROCEDURE — 82951 GLUCOSE TOLERANCE TEST (GTT): CPT

## 2024-04-05 PROCEDURE — 36415 COLL VENOUS BLD VENIPUNCTURE: CPT

## 2024-04-05 PROCEDURE — 82948 REAGENT STRIP/BLOOD GLUCOSE: CPT | Performed by: ADVANCED PRACTICE MIDWIFE

## 2024-04-05 PROCEDURE — 82952 GTT-ADDED SAMPLES: CPT

## 2024-04-24 ENCOUNTER — OFFICE VISIT (OUTPATIENT)
Dept: ENDOCRINOLOGY | Facility: CLINIC | Age: 29
End: 2024-04-24
Payer: COMMERCIAL

## 2024-04-24 ENCOUNTER — DOCUMENTATION (OUTPATIENT)
Dept: ENDOCRINOLOGY | Facility: CLINIC | Age: 29
End: 2024-04-24

## 2024-04-24 VITALS
DIASTOLIC BLOOD PRESSURE: 78 MMHG | BODY MASS INDEX: 40.72 KG/M2 | HEART RATE: 90 BPM | WEIGHT: 202 LBS | OXYGEN SATURATION: 99 % | HEIGHT: 59 IN | SYSTOLIC BLOOD PRESSURE: 128 MMHG | RESPIRATION RATE: 18 BRPM

## 2024-04-24 DIAGNOSIS — O24.419 GESTATIONAL DIABETES MELLITUS (GDM), ANTEPARTUM, GESTATIONAL DIABETES METHOD OF CONTROL UNSPECIFIED: Primary | ICD-10-CM

## 2024-04-24 RX ORDER — LANCETS 30 GAUGE
EACH MISCELLANEOUS
Qty: 150 EACH | Refills: 3 | Status: SHIPPED | OUTPATIENT
Start: 2024-04-24

## 2024-04-24 RX ORDER — CHLORPHENIR/PHENYLEPH/ASPIRIN 2-7.8-325
1 TABLET, EFFERVESCENT ORAL DAILY
Qty: 50 EACH | Refills: 2 | Status: SHIPPED | OUTPATIENT
Start: 2024-04-24

## 2024-04-24 RX ORDER — BLOOD-GLUCOSE METER
1 KIT MISCELLANEOUS ONCE
Qty: 1 EACH | Refills: 0 | Status: SHIPPED | OUTPATIENT
Start: 2024-04-24 | End: 2024-04-24

## 2024-04-24 RX ORDER — POLYETHYLENE GLYCOL 3350 17 G/17G
17 POWDER, FOR SOLUTION ORAL DAILY
COMMUNITY
Start: 2024-04-23

## 2024-04-24 RX ORDER — DOCUSATE SODIUM 100 MG/1
100 CAPSULE, LIQUID FILLED ORAL 2 TIMES DAILY PRN
COMMUNITY
Start: 2024-04-23

## 2024-04-24 NOTE — PROGRESS NOTES
Pt seen for GDM education, addressed all questions, reviewed nutrition, SMBG, exercise, and reviewed use of insulin in the event she may need it in the future. Provided pt with glucose logs to report to MD weekly. Pt has fup with MD in 1 week. Encouraged her to call with questions.

## 2024-04-24 NOTE — PROGRESS NOTES
Chief Complaint   Patient presents with    Gestational Diabetes     New Patient  GESTATIONAL DIABETES/ REFERRED BY KIM CEBALLOS        New patient who is being seen in consultation regarding gestational diabetes at the request of No ref. provider found     BAYRON Jett is a 28 y.o. female who presents for evaluation of gestational diabetes.    Patient reports that gestational diabetes was diagnosed via routine screening in pregnancy.  She denies any known history of glycemic abnormalities.  She did not have gestational diabetes during either of her 2 previous pregnancies.  She is now approximately 28 weeks gestation, she is expecting twins-1 boy and 1 girl.  She denies any other complications this pregnancy.  She is not yet monitoring blood glucose.  She does report a family history of type 2 diabetes.  She denies any current nausea or vomiting.  She reports that baby is moving well.    Past Medical History:   Diagnosis Date    Anxiety     Cholelithiasis     Depression     Dislocation of right knee     Pap smear for cervical cancer screening 2016     Past Surgical History:   Procedure Laterality Date    CHOLECYSTECTOMY  2016    KNEE SURGERY Right     PATELLAR TENDON GRAFT IMPLANTATION  2014    VAGINAL DELIVERY      x2    WISDOM TOOTH EXTRACTION        Family History   Problem Relation Age of Onset    Hypertension Mother     Diabetes Father     Diabetes Maternal Grandmother     Hypertension Maternal Grandmother     Diabetes Paternal Grandmother       Social History     Socioeconomic History    Marital status:    Tobacco Use    Smoking status: Never     Passive exposure: Never    Smokeless tobacco: Never   Vaping Use    Vaping status: Never Used   Substance and Sexual Activity    Alcohol use: Yes     Alcohol/week: 2.0 standard drinks of alcohol     Types: 2 Cans of beer per week    Drug use: No    Sexual activity: Yes     Partners: Male     Birth control/protection: Condom      No Known Allergies   Current  "Outpatient Medications on File Prior to Visit   Medication Sig Dispense Refill    docusate sodium (COLACE) 100 MG capsule Take 1 capsule by mouth 2 (Two) Times a Day As Needed for Constipation.      ferrous sulfate (FerrouSul) 325 (65 FE) MG tablet Take 1 tablet by mouth Daily With Breakfast. 90 tablet 1    polyethylene glycol (MIRALAX) 17 GM/SCOOP powder Take 17 g by mouth Daily.      Prenatal Vit-Fe Fumarate-FA (prenatal vitamin 28-0.8) 28-0.8 MG tablet tablet Take 1 tablet by mouth Daily.      [DISCONTINUED] azithromycin (ZITHROMAX) 250 MG tablet 2 tabs orally day 1, 1 tab orally once a days 2 through 5 6 tablet 0     No current facility-administered medications on file prior to visit.        Review of Systems   Constitutional:  Positive for fatigue.   HENT:  Positive for rhinorrhea and sneezing.    Eyes:  Positive for visual disturbance.   Respiratory:  Positive for cough and shortness of breath.    Gastrointestinal:  Positive for constipation and GERD.   Endocrine: Positive for polyphagia and polyuria.   Genitourinary:  Positive for urinary incontinence.   Musculoskeletal:  Positive for back pain.       Vitals:    04/24/24 0828   BP: 128/78   BP Location: Left arm   Patient Position: Sitting   Cuff Size: Adult   Pulse: 90   Resp: 18   SpO2: 99%   Weight: 91.6 kg (202 lb)   Height: 149.9 cm (59\")   Body mass index is 40.8 kg/m².     Physical Exam  Vitals reviewed.   Constitutional:       General: She is not in acute distress.  Cardiovascular:      Rate and Rhythm: Normal rate and regular rhythm.   Pulmonary:      Effort: Pulmonary effort is normal.      Breath sounds: Normal breath sounds.   Abdominal:      Comments: gravid   Neurological:      Mental Status: She is alert.   Psychiatric:         Mood and Affect: Mood and affect normal.         Behavior: Behavior is cooperative.          Labs/Imaging   Latest Reference Range & Units 02/07/24 16:37 03/26/24 10:43 04/05/24 09:17 04/05/24 10:22 04/05/24 11:21 " 24 12:23   Glucose 65 - 99 mg/dL 86        Hemoglobin A1C 4.80 - 5.60 % 5.70 (H)        Gestational GCT 65 - 139 mg/dL  152 (H)       Glucose, GTT - Fasting 74 - 106 mg/dL   96      Glucose, GTT - 1 Hour 74 - 180 mg/dL    214 (H)     Glucose, GTT - 2 Hour 74 - 155 mg/dL     127    Glucose, GTT - 3 Hour 74 - 140 mg/dL      100   (H): Data is abnormally high    Assessment and Plan    Diagnoses and all orders for this visit:    1. Gestational diabetes mellitus (GDM), antepartum, gestational diabetes method of control unspecified (Primary)  Patient is now approximately 28 weeks gestation  Patient failed gestational GCT with value of 152.  She failed OGTT with values of 96 fasting, 214 at 1 hour, 127 at 2 hours, 103 hours.  We also discussed that prior hemoglobin A1c of 5.7 in February is abnormal.  Patient is not yet monitoring blood glucose, supplies were sent to pharmacy today.  Reviewed instructions for glycemic monitoring and the monitoring of urine ketones.  Discussed that gestational diabetes can become harder to control as pregnancy progresses. Reviewed need for routine follow up from now until delivery.   Patient instructed to send glucose data weekly via [employer].  Risks of gestational diabetes were reviewed, these include, but are not limited to: LGA infant, macrosomia, stillbirth,  hypoglycemia, hyperbilirubinemia, birth injury,  birth, respiratory complications following delivery, polyhydramnios, hypocalcemia, maternal preeclampsia.  Nutritional goals reviewed: Patient advised to eat 3 moderate sized meals daily as well as 2-4 snacks, including a bedtime snack. Discussed need for carbohydrate awareness. Patient given goals for carbohydrate intake for meals/snacks.  Glycemic Targets during pregnancy were reviewed, as follows: fasting glucose <95, 1 hour post prandial <140, 2 hour postprandial <120.  -Patient advised that she will need to monitor blood glucose 4 times daily. In the event  that insurance may not cover adequate testing supplies, she was instructed she may need to purchase on OTC meter and strips.  -Patient will return for follow up in 1 week. She was instructed to contact the office in the interim if glucoses not at target.     Other orders  -     glucose blood test strip; Use as instructed 4 times a day  Dispense: 150 each; Refill: 3  -     glucose monitor monitoring kit; Use 1 each 1 (One) Time for 1 dose.  Dispense: 1 each; Refill: 0  -     Lancets misc; Use as instructed 4 times a day  Dispense: 150 each; Refill: 3  -     Acetone, Urine, Test (Ketone Test) strip; 1 strip by In Vitro route Daily.  Dispense: 50 each; Refill: 2      No follow-ups on file. The patient was instructed to contact the clinic with any interval questions or concerns.    Electronically signed by: Anabel Reyes MD     Dictated Utilizing Dragon Dictation

## 2024-05-09 ENCOUNTER — OFFICE VISIT (OUTPATIENT)
Dept: ENDOCRINOLOGY | Facility: CLINIC | Age: 29
End: 2024-05-09
Payer: COMMERCIAL

## 2024-05-09 VITALS
HEART RATE: 88 BPM | SYSTOLIC BLOOD PRESSURE: 116 MMHG | OXYGEN SATURATION: 97 % | DIASTOLIC BLOOD PRESSURE: 60 MMHG | WEIGHT: 206 LBS | HEIGHT: 59 IN | BODY MASS INDEX: 41.53 KG/M2

## 2024-05-09 DIAGNOSIS — O24.419 GESTATIONAL DIABETES MELLITUS (GDM), ANTEPARTUM, GESTATIONAL DIABETES METHOD OF CONTROL UNSPECIFIED: Primary | ICD-10-CM

## 2024-05-09 LAB
EXPIRATION DATE: NORMAL
EXPIRATION DATE: NORMAL
GLUCOSE BLDC GLUCOMTR-MCNC: 124 MG/DL (ref 70–130)
HBA1C MFR BLD: 5.5 % (ref 4.5–5.7)
Lab: NORMAL
Lab: NORMAL

## 2024-05-09 RX ORDER — PEN NEEDLE, DIABETIC 30 GX3/16"
1 NEEDLE, DISPOSABLE MISCELLANEOUS DAILY
Qty: 50 EACH | Refills: 11 | Status: SHIPPED | OUTPATIENT
Start: 2024-05-09

## 2024-05-20 ENCOUNTER — OFFICE VISIT (OUTPATIENT)
Dept: FAMILY MEDICINE CLINIC | Facility: CLINIC | Age: 29
End: 2024-05-20
Payer: COMMERCIAL

## 2024-05-20 VITALS
DIASTOLIC BLOOD PRESSURE: 82 MMHG | OXYGEN SATURATION: 98 % | HEIGHT: 59 IN | WEIGHT: 206 LBS | TEMPERATURE: 98.2 F | BODY MASS INDEX: 41.53 KG/M2 | SYSTOLIC BLOOD PRESSURE: 114 MMHG | HEART RATE: 74 BPM

## 2024-05-20 DIAGNOSIS — J30.2 SEASONAL ALLERGIES: Primary | ICD-10-CM

## 2024-05-20 LAB
EXPIRATION DATE: NORMAL
EXPIRATION DATE: NORMAL
FLUAV AG UPPER RESP QL IA.RAPID: NOT DETECTED
FLUBV AG UPPER RESP QL IA.RAPID: NOT DETECTED
INTERNAL CONTROL: NORMAL
INTERNAL CONTROL: NORMAL
Lab: NORMAL
Lab: NORMAL
S PYO AG THROAT QL: NEGATIVE
SARS-COV-2 AG UPPER RESP QL IA.RAPID: NOT DETECTED

## 2024-05-20 PROCEDURE — 1126F AMNT PAIN NOTED NONE PRSNT: CPT | Performed by: FAMILY MEDICINE

## 2024-05-20 PROCEDURE — 87428 SARSCOV & INF VIR A&B AG IA: CPT | Performed by: FAMILY MEDICINE

## 2024-05-20 PROCEDURE — 99213 OFFICE O/P EST LOW 20 MIN: CPT | Performed by: FAMILY MEDICINE

## 2024-05-20 PROCEDURE — 1160F RVW MEDS BY RX/DR IN RCRD: CPT | Performed by: FAMILY MEDICINE

## 2024-05-20 PROCEDURE — 87880 STREP A ASSAY W/OPTIC: CPT | Performed by: FAMILY MEDICINE

## 2024-05-20 PROCEDURE — 1159F MED LIST DOCD IN RCRD: CPT | Performed by: FAMILY MEDICINE

## 2024-05-20 RX ORDER — CETIRIZINE HYDROCHLORIDE 10 MG/1
10 TABLET ORAL DAILY
Qty: 90 TABLET | Refills: 3 | Status: SHIPPED | OUTPATIENT
Start: 2024-05-20

## 2024-05-20 RX ORDER — FLUTICASONE PROPIONATE 50 MCG
1 SPRAY, SUSPENSION (ML) NASAL DAILY
Qty: 9.9 ML | Refills: 11 | Status: SHIPPED | OUTPATIENT
Start: 2024-05-20

## 2024-05-20 NOTE — ASSESSMENT & PLAN NOTE
Flu, COVID, and strep screen negative.  Recommend start daily antihistamine.Recommended supportive care, adequate hydration, and stressed the importance of healthy hygiene habits to avoid spread (I.e. hand washing, social distancing) Advised patient to call clinic if they develop fever or if symptoms worsen/persist.

## 2024-05-20 NOTE — PROGRESS NOTES
Office Note     Name: Neema Jett    : 1995     MRN: [id number]     Chief Complaint  Sore Throat, Cough, Nasal Congestion (Pt states symptoms have been ongoing for 2 days), and Earache    Subjective     History of Present Illness:  Neema Jett is a 28 y.o. female who presents today for evaluation of upper respiratory symptoms.    Patient reports that she had a 2-day history of sore throat, cough, nasal congestion, and earache.  She states that came on suddenly.  She denies any fevers.  She notes that no one at home is sick.  She was diagnosed with a sinus infection 1 month ago and completed a course of antibiotics.  Of note she is currently 31 weeks pregnant with twins.  She has been using some over-the-counter Zyrtec with some relief of her symptoms.    She does have gestational diabetes and is currently taking insulin along with a prenatal vitamin.      Review of Systems:   Review of Systems   HENT:  Positive for congestion, ear pain and sore throat.    Respiratory:  Positive for cough.    All other systems reviewed and are negative.      Past Medical History:   Past Medical History:   Diagnosis Date    Anxiety     Cholelithiasis     Depression     Dislocation of right knee     Pap smear for cervical cancer screening 2016       Past Surgical History:   Past Surgical History:   Procedure Laterality Date    CHOLECYSTECTOMY  2016    KNEE SURGERY Right     PATELLAR TENDON GRAFT IMPLANTATION  2014    VAGINAL DELIVERY      x2    WISDOM TOOTH EXTRACTION         Family History:   Family History   Problem Relation Age of Onset    Hypertension Mother     Diabetes Father     Diabetes Maternal Grandmother     Hypertension Maternal Grandmother     Diabetes Paternal Grandmother        Social History:   Social History     Socioeconomic History    Marital status:    Tobacco Use    Smoking status: Never     Passive exposure: Never    Smokeless tobacco: Never   Vaping Use    Vaping status: Never Used  "  Substance and Sexual Activity    Alcohol use: Yes     Alcohol/week: 2.0 standard drinks of alcohol     Types: 2 Cans of beer per week    Drug use: No    Sexual activity: Yes     Partners: Male     Birth control/protection: Condom       Immunizations:   Immunization History   Administered Date(s) Administered    COVID-19 (PFIZER) Purple Cap Monovalent 05/26/2021, 10/20/2021    Fluzone (or Fluarix & Flulaval for VFC) >6mos 01/20/2023, 10/13/2023    Hepatitis A 04/30/2021    Tdap 01/01/2014        Medications:     Current Outpatient Medications:     Acetone, Urine, Test (Ketone Test) strip, 1 strip by In Vitro route Daily., Disp: 50 each, Rfl: 2    docusate sodium (COLACE) 100 MG capsule, Take 1 capsule by mouth 2 (Two) Times a Day As Needed for Constipation., Disp: , Rfl:     ferrous sulfate (FerrouSul) 325 (65 FE) MG tablet, Take 1 tablet by mouth Daily With Breakfast., Disp: 90 tablet, Rfl: 1    glucose blood test strip, Use as instructed 4 times a day, Disp: 150 each, Rfl: 3    Insulin Glargine (LANTUS SOLOSTAR) 100 UNIT/ML injection pen, Start 10 units at bedtime, titrate per instructions, MDD 30 units, Disp: 15 mL, Rfl: 2    Insulin Pen Needle (Pen Needles) 32G X 4 MM misc, Use 1 each Daily., Disp: 50 each, Rfl: 11    Lancets misc, Use as instructed 4 times a day, Disp: 150 each, Rfl: 3    polyethylene glycol (MIRALAX) 17 GM/SCOOP powder, Take 17 g by mouth Daily., Disp: , Rfl:     Prenatal Vit-Fe Fumarate-FA (prenatal vitamin 28-0.8) 28-0.8 MG tablet tablet, Take 1 tablet by mouth Daily., Disp: , Rfl:     cetirizine (zyrTEC) 10 MG tablet, Take 1 tablet by mouth Daily., Disp: 90 tablet, Rfl: 3    fluticasone (FLONASE) 50 MCG/ACT nasal spray, 1 spray into the nostril(s) as directed by provider Daily., Disp: 9.9 mL, Rfl: 11    Allergies:   No Known Allergies    Objective     Vital Signs  /82   Pulse 74   Temp 98.2 °F (36.8 °C)   Ht 149.9 cm (59.02\")   Wt 93.4 kg (206 lb)   SpO2 98%   BMI 41.58 kg/m² " "  Estimated body mass index is 41.58 kg/m² as calculated from the following:    Height as of this encounter: 149.9 cm (59.02\").    Weight as of this encounter: 93.4 kg (206 lb).         Physical Exam  Vitals and nursing note reviewed.   Constitutional:       Appearance: Normal appearance. She is normal weight.   HENT:      Head: Normocephalic and atraumatic.      Right Ear: Tympanic membrane, ear canal and external ear normal.      Left Ear: Tympanic membrane, ear canal and external ear normal.      Ears:      Comments: Redness of right canal     Nose: Nose normal.      Mouth/Throat:      Mouth: Mucous membranes are moist.   Eyes:      Extraocular Movements: Extraocular movements intact.      Pupils: Pupils are equal, round, and reactive to light.   Cardiovascular:      Rate and Rhythm: Normal rate and regular rhythm.      Heart sounds: Normal heart sounds.   Pulmonary:      Effort: Pulmonary effort is normal.      Breath sounds: Normal breath sounds.   Abdominal:      General: Abdomen is flat.   Musculoskeletal:         General: Normal range of motion.      Cervical back: Normal range of motion.   Skin:     General: Skin is warm.   Neurological:      General: No focal deficit present.      Mental Status: She is alert and oriented to person, place, and time.   Psychiatric:         Mood and Affect: Mood normal.         Behavior: Behavior normal.         Thought Content: Thought content normal.         Judgment: Judgment normal.            Procedures     Results:  Recent Results (from the past 24 hour(s))   POCT rapid strep A    Collection Time: 05/20/24 11:57 AM    Specimen: Swab   Result Value Ref Range    Rapid Strep A Screen Negative Negative, VALID, INVALID, Not Performed    Internal Control Passed Passed    Lot Number [id number]     Expiration Date 02/28/2025    POCT SARS-CoV-2 Antigen ROD + Flu    Collection Time: 05/20/24 11:59 AM    Specimen: Swab   Result Value Ref Range    SARS Antigen Not Detected Not " Detected, Presumptive Negative    Influenza A Antigen ROD Not Detected Not Detected    Influenza B Antigen ROD Not Detected Not Detected    Internal Control Passed Passed    Lot Number [id number]     Expiration Date 02/15/2025         Assessment and Plan     Assessment/Plan:  Diagnoses and all orders for this visit:    1. Seasonal allergies (Primary)  Assessment & Plan:  Flu, COVID, and strep screen negative.  Recommend start daily antihistamine.Recommended supportive care, adequate hydration, and stressed the importance of healthy hygiene habits to avoid spread (I.e. hand washing, social distancing) Advised patient to call clinic if they develop fever or if symptoms worsen/persist.       Orders:  -     POCT rapid strep A  -     POCT SARS-CoV-2 Antigen ROD + Flu  -     cetirizine (zyrTEC) 10 MG tablet; Take 1 tablet by mouth Daily.  Dispense: 90 tablet; Refill: 3  -     fluticasone (FLONASE) 50 MCG/ACT nasal spray; 1 spray into the nostril(s) as directed by provider Daily.  Dispense: 9.9 mL; Refill: 11        Follow Up  Return if symptoms worsen or fail to improve.    [place] DO   [facility]

## 2024-05-22 ENCOUNTER — TELEPHONE (OUTPATIENT)
Dept: ENDOCRINOLOGY | Facility: CLINIC | Age: 29
End: 2024-05-22
Payer: COMMERCIAL

## 2024-05-22 RX ORDER — INSULIN LISPRO 100 [IU]/ML
INJECTION, SOLUTION INTRAVENOUS; SUBCUTANEOUS
Qty: 15 ML | Refills: 5 | Status: SHIPPED | OUTPATIENT
Start: 2024-05-22

## 2024-05-22 RX ORDER — PEN NEEDLE, DIABETIC 30 GX3/16"
1 NEEDLE, DISPOSABLE MISCELLANEOUS 3 TIMES DAILY
Qty: 100 EACH | Refills: 3 | Status: SHIPPED | OUTPATIENT
Start: 2024-05-22

## 2024-05-28 ENCOUNTER — OFFICE VISIT (OUTPATIENT)
Dept: ENDOCRINOLOGY | Facility: CLINIC | Age: 29
End: 2024-05-28
Payer: COMMERCIAL

## 2024-05-28 VITALS
DIASTOLIC BLOOD PRESSURE: 64 MMHG | HEART RATE: 80 BPM | WEIGHT: 210 LBS | SYSTOLIC BLOOD PRESSURE: 122 MMHG | OXYGEN SATURATION: 98 % | BODY MASS INDEX: 42.33 KG/M2 | HEIGHT: 59 IN

## 2024-05-28 DIAGNOSIS — O24.419 GESTATIONAL DIABETES MELLITUS (GDM), ANTEPARTUM, GESTATIONAL DIABETES METHOD OF CONTROL UNSPECIFIED: Primary | ICD-10-CM

## 2024-05-28 LAB
EXPIRATION DATE: NORMAL
GLUCOSE BLDC GLUCOMTR-MCNC: 90 MG/DL (ref 70–130)
Lab: NORMAL

## 2024-05-28 PROCEDURE — 82947 ASSAY GLUCOSE BLOOD QUANT: CPT | Performed by: INTERNAL MEDICINE

## 2024-05-28 PROCEDURE — 99214 OFFICE O/P EST MOD 30 MIN: CPT | Performed by: INTERNAL MEDICINE

## 2024-05-28 RX ORDER — LANCETS 30 GAUGE
EACH MISCELLANEOUS
Qty: 150 EACH | Refills: 3 | Status: SHIPPED | OUTPATIENT
Start: 2024-05-28

## 2024-05-28 RX ORDER — INSULIN LISPRO 100 [IU]/ML
INJECTION, SOLUTION INTRAVENOUS; SUBCUTANEOUS
Qty: 15 ML | Refills: 5 | Status: SHIPPED | OUTPATIENT
Start: 2024-05-28

## 2024-05-28 NOTE — PROGRESS NOTES
"Chief Complaint   Patient presents with    Gestational Diabetes        HPI   Neema Jett is a 28 y.o. female had concerns including Gestational Diabetes.      Patient reports she is 34 weeks gestation today. She will see OB later today.  She has been increasing insulin but does report values remain somewhat high.  She denies any issues with insulin injections.  She has not had any hypoglycemia.  She is currently taking Lantus 32 units at bedtime and Humalog 8 units with lunch and dinner.    Glucose data from the previous week was reviewed, values are intermittently above goal fasting and post-prandially.    The following portions of the patient's history were reviewed and updated as appropriate: allergies, current medications, and past social history.    Review of Systems   Constitutional:  Negative for activity change and appetite change.        /64 (BP Location: Left arm, Patient Position: Sitting, Cuff Size: Adult)   Pulse 80   Ht 149.9 cm (59\")   Wt 95.3 kg (210 lb)   LMP 07/29/2023   SpO2 98%   BMI 42.41 kg/m²      Physical Exam      Constitutional:  well developed; well nourished  no acute distress   ENT/Thyroid: not examined   Eyes: Conjunctiva: clear   Respiratory:  breathing is unlabored  clear to auscultation bilaterally   Cardiovascular:  regular rate and rhythm   Chest:  Not performed.   Abdomen: Not performed.   : Not performed.   Musculoskeletal: Not performed   Skin: not performed.   Neuro: mental status, speech normal   Psych: mood and affect are within normal limits       Labs/Imaging   Latest Reference Range & Units 05/28/24 07:59   Glucose 70 - 130 mg/dL 90       Diagnoses and all orders for this visit:    1. Gestational diabetes mellitus (GDM), antepartum, gestational diabetes method of control unspecified (Primary)  -     POC Glucose, Blood  Approximately 34 weeks gestation  Glucose values are not at goal for pregnancy.  Both fasting and post meal values are elevated.  Patient " will increase Lantus to 36 units tonight, she will continue titration by 4 units every other day until fasting glucose values are less than 95.  Patient with continued postprandial hyperglycemia most prominent after lunch and dinner.  She increased Humalog yesterday and will continue 8 units.  She will continue titration by 2 units every other day until postprandial glucose values are less than 120.  Reviewed instructions for glycemic monitoring and the monitoring of urine ketones.  Discussed that gestational diabetes can become harder to control as pregnancy progresses. Reviewed need for routine follow up from now until delivery.   Patient instructed to send glucose data weekly via [employer].  Risks of gestational diabetes were reviewed, these include, but are not limited to: LGA infant, macrosomia, stillbirth,  hypoglycemia, hyperbilirubinemia, birth injury,  birth, respiratory complications following delivery, polyhydramnios, hypocalcemia, maternal preeclampsia.  Glycemic Targets during pregnancy were reviewed, as follows: fasting glucose <95, 1 hour post prandial <140, 2 hour postprandial <120.  Patient reports tentative plans for delivery at 37 weeks.  Discussed plans for medication postpartum.  Patient will discontinue insulin therapy postpartum.  Discussed recommendations for postpartum glucose monitoring.  Patient will follow-up 6 to 8 weeks postpartum.  She was instructed to contact the office in the interim if glucoses not at target.     Other orders  -     Lancets misc; Use as instructed 4 times a day  Dispense: 150 each; Refill: 3  -     glucose blood test strip; Use as instructed 4 times a day  Dispense: 150 each; Refill: 3        Return in about 3 months (around 2024) for Next scheduled follow up. The patient was instructed to contact the clinic with any interval questions or concerns.    Electronically signed by: Anabel Reyes MD   Endocrinologist    Dictated Utilizing Dragon  Dictation

## 2024-05-29 RX ORDER — LANCETS 30 GAUGE
EACH MISCELLANEOUS
Qty: 150 EACH | Refills: 3 | OUTPATIENT
Start: 2024-05-29

## 2024-05-29 NOTE — TELEPHONE ENCOUNTER
Caller: Neema Jett    Relationship: Self    Best call back number: [phone]     Requested Prescriptions:   Requested Prescriptions     Pending Prescriptions Disp Refills    glucose blood test strip 150 each 3     Sig: Use as instructed 4 times a day    Lancets misc 150 each 3     Sig: Use as instructed 4 times a day        Pharmacy where request should be sent: [id number] - [place], [place] - [address] [phone] [facility] [phone]      Last office visit with prescribing clinician: 9/23/2023   Last telemedicine visit with prescribing clinician: Visit date not found   Next office visit with prescribing clinician: Visit date not found     Does the patient have less than a 3 day supply:  [x] Yes  [] No    Would you like a call back once the refill request has been completed: [] Yes [x] No    If the office needs to give you a call back, can they leave a voicemail: [] Yes [x] No    Morgan Cochran Rep   05/29/24 14:24 EDT

## 2024-06-24 ENCOUNTER — PREP FOR SURGERY (OUTPATIENT)
Dept: OTHER | Facility: HOSPITAL | Age: 29
End: 2024-06-24
Payer: COMMERCIAL

## 2024-06-24 DIAGNOSIS — O30.049 TWIN PREGNANCY, DICHORIONIC/DIAMNIOTIC, UNSPECIFIED TRIMESTER: Primary | ICD-10-CM

## 2024-06-24 RX ORDER — SODIUM CHLORIDE, SODIUM LACTATE, POTASSIUM CHLORIDE, CALCIUM CHLORIDE 600; 310; 30; 20 MG/100ML; MG/100ML; MG/100ML; MG/100ML
125 INJECTION, SOLUTION INTRAVENOUS CONTINUOUS
Status: CANCELLED | OUTPATIENT
Start: 2024-06-24

## 2024-06-24 RX ORDER — OXYTOCIN/0.9 % SODIUM CHLORIDE 30/500 ML
30 PLASTIC BAG, INJECTION (ML) INTRAVENOUS CONTINUOUS
Status: CANCELLED | OUTPATIENT
Start: 2024-06-24 | End: 2024-06-24

## 2024-06-24 RX ORDER — ACETAMINOPHEN 325 MG/1
650 TABLET ORAL EVERY 4 HOURS PRN
Status: CANCELLED | OUTPATIENT
Start: 2024-06-24

## 2024-06-24 RX ORDER — ONDANSETRON 2 MG/ML
4 INJECTION INTRAMUSCULAR; INTRAVENOUS EVERY 6 HOURS PRN
Status: CANCELLED | OUTPATIENT
Start: 2024-06-24

## 2024-06-24 RX ORDER — SODIUM CHLORIDE 0.9 % (FLUSH) 0.9 %
10 SYRINGE (ML) INJECTION EVERY 12 HOURS SCHEDULED
Status: CANCELLED | OUTPATIENT
Start: 2024-06-24

## 2024-06-24 RX ORDER — MISOPROSTOL 200 UG/1
800 TABLET ORAL AS NEEDED
Status: CANCELLED | OUTPATIENT
Start: 2024-06-24

## 2024-06-24 RX ORDER — OXYTOCIN/0.9 % SODIUM CHLORIDE 30/500 ML
2-20 PLASTIC BAG, INJECTION (ML) INTRAVENOUS
Status: CANCELLED | OUTPATIENT
Start: 2024-06-24

## 2024-06-24 RX ORDER — OXYTOCIN/0.9 % SODIUM CHLORIDE 30/500 ML
30 PLASTIC BAG, INJECTION (ML) INTRAVENOUS ONCE
Status: CANCELLED | OUTPATIENT
Start: 2024-06-24 | End: 2024-06-24

## 2024-06-24 RX ORDER — CARBOPROST TROMETHAMINE 250 UG/ML
250 INJECTION, SOLUTION INTRAMUSCULAR AS NEEDED
Status: CANCELLED | OUTPATIENT
Start: 2024-06-24

## 2024-06-24 RX ORDER — LIDOCAINE HYDROCHLORIDE 10 MG/ML
0.5 INJECTION, SOLUTION EPIDURAL; INFILTRATION; INTRACAUDAL; PERINEURAL ONCE AS NEEDED
Status: CANCELLED | OUTPATIENT
Start: 2024-06-24

## 2024-06-24 RX ORDER — SODIUM CHLORIDE 0.9 % (FLUSH) 0.9 %
10 SYRINGE (ML) INJECTION AS NEEDED
Status: CANCELLED | OUTPATIENT
Start: 2024-06-24

## 2024-06-24 RX ORDER — MAGNESIUM CARB/ALUMINUM HYDROX 105-160MG
30 TABLET,CHEWABLE ORAL ONCE
Status: CANCELLED | OUTPATIENT
Start: 2024-06-24 | End: 2024-06-24

## 2024-06-24 RX ORDER — METHYLERGONOVINE MALEATE 0.2 MG/ML
200 INJECTION INTRAVENOUS ONCE AS NEEDED
Status: CANCELLED | OUTPATIENT
Start: 2024-06-24

## 2024-06-24 RX ORDER — PROMETHAZINE HYDROCHLORIDE 12.5 MG/1
12.5 SUPPOSITORY RECTAL EVERY 6 HOURS PRN
Status: CANCELLED | OUTPATIENT
Start: 2024-06-24

## 2024-06-24 RX ORDER — ONDANSETRON 4 MG/1
4 TABLET, ORALLY DISINTEGRATING ORAL EVERY 6 HOURS PRN
Status: CANCELLED | OUTPATIENT
Start: 2024-06-24

## 2024-06-24 RX ORDER — PROMETHAZINE HYDROCHLORIDE 12.5 MG/1
12.5 TABLET ORAL EVERY 6 HOURS PRN
Status: CANCELLED | OUTPATIENT
Start: 2024-06-24

## 2024-06-24 RX ORDER — SODIUM CHLORIDE 9 MG/ML
40 INJECTION, SOLUTION INTRAVENOUS AS NEEDED
Status: CANCELLED | OUTPATIENT
Start: 2024-06-24

## 2024-06-24 RX ORDER — TERBUTALINE SULFATE 1 MG/ML
0.25 INJECTION, SOLUTION SUBCUTANEOUS AS NEEDED
Status: CANCELLED | OUTPATIENT
Start: 2024-06-24

## 2024-06-26 ENCOUNTER — HOSPITAL ENCOUNTER (INPATIENT)
Facility: HOSPITAL | Age: 29
LOS: 3 days | Discharge: HOME | End: 2024-06-29
Attending: OBSTETRICS & GYNECOLOGY | Admitting: OBSTETRICS & GYNECOLOGY
Payer: COMMERCIAL

## 2024-06-26 ENCOUNTER — ANESTHESIA (OUTPATIENT)
Dept: LABOR AND DELIVERY | Facility: HOSPITAL | Age: 29
End: 2024-06-26
Payer: COMMERCIAL

## 2024-06-26 ENCOUNTER — ANESTHESIA EVENT (OUTPATIENT)
Dept: LABOR AND DELIVERY | Facility: HOSPITAL | Age: 29
End: 2024-06-26
Payer: COMMERCIAL

## 2024-06-26 ENCOUNTER — HOSPITAL ENCOUNTER (OUTPATIENT)
Dept: LABOR AND DELIVERY | Facility: HOSPITAL | Age: 29
Discharge: HOME | End: 2024-06-26
Payer: COMMERCIAL

## 2024-06-26 DIAGNOSIS — O30.049 TWIN PREGNANCY, DICHORIONIC/DIAMNIOTIC, UNSPECIFIED TRIMESTER: ICD-10-CM

## 2024-06-26 DIAGNOSIS — Z98.891 STATUS POST PRIMARY LOW TRANSVERSE CESAREAN SECTION: Primary | ICD-10-CM

## 2024-06-26 PROBLEM — Z34.90 TERM PREGNANCY: Status: ACTIVE | Noted: 2024-06-26

## 2024-06-26 PROBLEM — S83.104A DISLOCATION OF RIGHT KNEE: Status: RESOLVED | Noted: 2022-08-19 | Resolved: 2024-06-26

## 2024-06-26 PROBLEM — J30.2 SEASONAL ALLERGIES: Status: RESOLVED | Noted: 2024-05-20 | Resolved: 2024-06-26

## 2024-06-26 PROBLEM — K80.20 CHOLELITHIASIS: Status: RESOLVED | Noted: 2022-08-19 | Resolved: 2024-06-26

## 2024-06-26 PROBLEM — Z83.3 FAMILY HISTORY OF DIABETES MELLITUS: Status: RESOLVED | Noted: 2019-02-15 | Resolved: 2024-06-26

## 2024-06-26 PROBLEM — Z78.9 USE OF CONDOMS FOR CONTRACEPTION: Status: RESOLVED | Noted: 2019-02-15 | Resolved: 2024-06-26

## 2024-06-26 PROBLEM — O30.043 DICHORIONIC DIAMNIOTIC TWIN PREGNANCY IN THIRD TRIMESTER: Status: ACTIVE | Noted: 2024-06-26

## 2024-06-26 LAB
ABO GROUP BLD: NORMAL
BLD GP AB SCN SERPL QL: NEGATIVE
DEPRECATED RDW RBC AUTO: 47.8 FL (ref 37–54)
ERYTHROCYTE [DISTWIDTH] IN BLOOD BY AUTOMATED COUNT: 15.2 % (ref 12.3–15.4)
GLUCOSE BLDC GLUCOMTR-MCNC: 83 MG/DL (ref 70–130)
GLUCOSE BLDC GLUCOMTR-MCNC: 90 MG/DL (ref 70–130)
HCT VFR BLD AUTO: 27.3 % (ref 34–46.6)
HCT VFR BLD AUTO: 31.6 % (ref 34–46.6)
HGB BLD-MCNC: 10.8 G/DL (ref 12–15.9)
HGB BLD-MCNC: 9.3 G/DL (ref 12–15.9)
MCH RBC QN AUTO: 29.9 PG (ref 26.6–33)
MCHC RBC AUTO-ENTMCNC: 34.2 G/DL (ref 31.5–35.7)
MCV RBC AUTO: 87.5 FL (ref 79–97)
PLATELET # BLD AUTO: 183 10*3/MM3 (ref 140–450)
PMV BLD AUTO: 10.5 FL (ref 6–12)
RBC # BLD AUTO: 3.61 10*6/MM3 (ref 3.77–5.28)
RH BLD: POSITIVE
T PALLIDUM IGG SER QL: NORMAL
T&S EXPIRATION DATE: NORMAL
WBC NRBC COR # BLD AUTO: 7.28 10*3/MM3 (ref 3.4–10.8)

## 2024-06-26 PROCEDURE — 82948 REAGENT STRIP/BLOOD GLUCOSE: CPT

## 2024-06-26 PROCEDURE — 86850 RBC ANTIBODY SCREEN: CPT | Performed by: OBSTETRICS & GYNECOLOGY

## 2024-06-26 PROCEDURE — 25010000002 FENTANYL CITRATE (PF) 100 MCG/2ML SOLUTION: Performed by: NURSE ANESTHETIST, CERTIFIED REGISTERED

## 2024-06-26 PROCEDURE — 25010000002 METOCLOPRAMIDE PER 10 MG: Performed by: NURSE ANESTHETIST, CERTIFIED REGISTERED

## 2024-06-26 PROCEDURE — 25010000002 METHYLERGONOVINE MALEATE PER 0.2 MG: Performed by: NURSE ANESTHETIST, CERTIFIED REGISTERED

## 2024-06-26 PROCEDURE — 88302 TISSUE EXAM BY PATHOLOGIST: CPT | Performed by: OBSTETRICS & GYNECOLOGY

## 2024-06-26 PROCEDURE — 25810000003 LACTATED RINGERS PER 1000 ML: Performed by: OBSTETRICS & GYNECOLOGY

## 2024-06-26 PROCEDURE — 85027 COMPLETE CBC AUTOMATED: CPT | Performed by: OBSTETRICS & GYNECOLOGY

## 2024-06-26 PROCEDURE — 25810000003 LACTATED RINGERS SOLUTION: Performed by: OBSTETRICS & GYNECOLOGY

## 2024-06-26 PROCEDURE — 85014 HEMATOCRIT: CPT | Performed by: OBSTETRICS & GYNECOLOGY

## 2024-06-26 PROCEDURE — 25010000002 MORPHINE PER 10 MG: Performed by: NURSE ANESTHETIST, CERTIFIED REGISTERED

## 2024-06-26 PROCEDURE — 25010000002 BUPIVACAINE IN DEXTROSE 0.75-8.25 % SOLUTION: Performed by: NURSE ANESTHETIST, CERTIFIED REGISTERED

## 2024-06-26 PROCEDURE — 25010000002 ONDANSETRON PER 1 MG: Performed by: NURSE ANESTHETIST, CERTIFIED REGISTERED

## 2024-06-26 PROCEDURE — 0UB70ZZ EXCISION OF BILATERAL FALLOPIAN TUBES, OPEN APPROACH: ICD-10-PCS | Performed by: OBSTETRICS & GYNECOLOGY

## 2024-06-26 PROCEDURE — 86780 TREPONEMA PALLIDUM: CPT | Performed by: OBSTETRICS & GYNECOLOGY

## 2024-06-26 PROCEDURE — 85018 HEMOGLOBIN: CPT | Performed by: OBSTETRICS & GYNECOLOGY

## 2024-06-26 PROCEDURE — 25010000002 KETOROLAC TROMETHAMINE PER 15 MG: Performed by: OBSTETRICS & GYNECOLOGY

## 2024-06-26 PROCEDURE — 86901 BLOOD TYPING SEROLOGIC RH(D): CPT | Performed by: OBSTETRICS & GYNECOLOGY

## 2024-06-26 PROCEDURE — 86900 BLOOD TYPING SEROLOGIC ABO: CPT | Performed by: OBSTETRICS & GYNECOLOGY

## 2024-06-26 PROCEDURE — 25010000002 CEFAZOLIN PER 500 MG: Performed by: OBSTETRICS & GYNECOLOGY

## 2024-06-26 PROCEDURE — 25010000002 MIDAZOLAM PER 1 MG: Performed by: NURSE ANESTHETIST, CERTIFIED REGISTERED

## 2024-06-26 RX ORDER — NALOXONE HCL 0.4 MG/ML
0.4 VIAL (ML) INJECTION
Status: ACTIVE | OUTPATIENT
Start: 2024-06-26 | End: 2024-06-27

## 2024-06-26 RX ORDER — ONDANSETRON 4 MG/1
4 TABLET, ORALLY DISINTEGRATING ORAL EVERY 6 HOURS PRN
Status: DISCONTINUED | OUTPATIENT
Start: 2024-06-26 | End: 2024-06-26 | Stop reason: HOSPADM

## 2024-06-26 RX ORDER — MAGNESIUM CARB/ALUMINUM HYDROX 105-160MG
30 TABLET,CHEWABLE ORAL ONCE
Status: DISCONTINUED | OUTPATIENT
Start: 2024-06-26 | End: 2024-06-26

## 2024-06-26 RX ORDER — METHYLERGONOVINE MALEATE 0.2 MG/ML
INJECTION INTRAVENOUS AS NEEDED
Status: DISCONTINUED | OUTPATIENT
Start: 2024-06-26 | End: 2024-06-26 | Stop reason: SURG

## 2024-06-26 RX ORDER — HYDROXYZINE HYDROCHLORIDE 25 MG/1
50 TABLET, FILM COATED ORAL EVERY 6 HOURS PRN
Status: DISCONTINUED | OUTPATIENT
Start: 2024-06-26 | End: 2024-06-29 | Stop reason: HOSPADM

## 2024-06-26 RX ORDER — KETOROLAC TROMETHAMINE 30 MG/ML
30 INJECTION, SOLUTION INTRAMUSCULAR; INTRAVENOUS EVERY 6 HOURS PRN
Status: DISCONTINUED | OUTPATIENT
Start: 2024-06-26 | End: 2024-06-29 | Stop reason: HOSPADM

## 2024-06-26 RX ORDER — SODIUM CHLORIDE 9 MG/ML
40 INJECTION, SOLUTION INTRAVENOUS AS NEEDED
Status: DISCONTINUED | OUTPATIENT
Start: 2024-06-26 | End: 2024-06-29 | Stop reason: HOSPADM

## 2024-06-26 RX ORDER — CALCIUM CARBONATE 500 MG/1
1 TABLET, CHEWABLE ORAL EVERY 4 HOURS PRN
Status: DISCONTINUED | OUTPATIENT
Start: 2024-06-26 | End: 2024-06-29 | Stop reason: HOSPADM

## 2024-06-26 RX ORDER — SODIUM CHLORIDE 0.9 % (FLUSH) 0.9 %
3 SYRINGE (ML) INJECTION EVERY 12 HOURS SCHEDULED
Status: DISCONTINUED | OUTPATIENT
Start: 2024-06-26 | End: 2024-06-29 | Stop reason: HOSPADM

## 2024-06-26 RX ORDER — ACETAMINOPHEN 325 MG/1
650 TABLET ORAL EVERY 4 HOURS PRN
Status: DISCONTINUED | OUTPATIENT
Start: 2024-06-26 | End: 2024-06-26

## 2024-06-26 RX ORDER — OXYTOCIN/0.9 % SODIUM CHLORIDE 30/500 ML
PLASTIC BAG, INJECTION (ML) INTRAVENOUS AS NEEDED
Status: DISCONTINUED | OUTPATIENT
Start: 2024-06-26 | End: 2024-06-26 | Stop reason: SURG

## 2024-06-26 RX ORDER — PROMETHAZINE HYDROCHLORIDE 12.5 MG/1
12.5 SUPPOSITORY RECTAL EVERY 6 HOURS PRN
Status: DISCONTINUED | OUTPATIENT
Start: 2024-06-26 | End: 2024-06-26 | Stop reason: HOSPADM

## 2024-06-26 RX ORDER — PROMETHAZINE HYDROCHLORIDE 25 MG/1
25 TABLET ORAL EVERY 6 HOURS PRN
Status: DISCONTINUED | OUTPATIENT
Start: 2024-06-26 | End: 2024-06-29 | Stop reason: HOSPADM

## 2024-06-26 RX ORDER — MORPHINE SULFATE 0.5 MG/ML
INJECTION, SOLUTION EPIDURAL; INTRATHECAL; INTRAVENOUS AS NEEDED
Status: DISCONTINUED | OUTPATIENT
Start: 2024-06-26 | End: 2024-06-26 | Stop reason: SURG

## 2024-06-26 RX ORDER — ONDANSETRON 2 MG/ML
INJECTION INTRAMUSCULAR; INTRAVENOUS AS NEEDED
Status: DISCONTINUED | OUTPATIENT
Start: 2024-06-26 | End: 2024-06-26 | Stop reason: SURG

## 2024-06-26 RX ORDER — ONDANSETRON 2 MG/ML
4 INJECTION INTRAMUSCULAR; INTRAVENOUS ONCE AS NEEDED
Status: ACTIVE | OUTPATIENT
Start: 2024-06-26 | End: 2024-06-27

## 2024-06-26 RX ORDER — OXYTOCIN/0.9 % SODIUM CHLORIDE 30/500 ML
2-20 PLASTIC BAG, INJECTION (ML) INTRAVENOUS
Status: DISCONTINUED | OUTPATIENT
Start: 2024-06-26 | End: 2024-06-26

## 2024-06-26 RX ORDER — PROMETHAZINE HYDROCHLORIDE 12.5 MG/1
12.5 SUPPOSITORY RECTAL EVERY 6 HOURS PRN
Status: DISCONTINUED | OUTPATIENT
Start: 2024-06-26 | End: 2024-06-29 | Stop reason: HOSPADM

## 2024-06-26 RX ORDER — SODIUM CHLORIDE 0.9 % (FLUSH) 0.9 %
10 SYRINGE (ML) INJECTION EVERY 12 HOURS SCHEDULED
Status: DISCONTINUED | OUTPATIENT
Start: 2024-06-26 | End: 2024-06-26 | Stop reason: HOSPADM

## 2024-06-26 RX ORDER — OXYTOCIN/0.9 % SODIUM CHLORIDE 30/500 ML
30 PLASTIC BAG, INJECTION (ML) INTRAVENOUS CONTINUOUS
Status: ACTIVE | OUTPATIENT
Start: 2024-06-26 | End: 2024-06-26

## 2024-06-26 RX ORDER — OXYCODONE HYDROCHLORIDE 10 MG/1
10 TABLET ORAL EVERY 4 HOURS PRN
Status: DISCONTINUED | OUTPATIENT
Start: 2024-06-26 | End: 2024-06-29 | Stop reason: HOSPADM

## 2024-06-26 RX ORDER — CETIRIZINE HYDROCHLORIDE 10 MG/1
10 TABLET ORAL DAILY
Status: DISCONTINUED | OUTPATIENT
Start: 2024-06-26 | End: 2024-06-27

## 2024-06-26 RX ORDER — METOCLOPRAMIDE HYDROCHLORIDE 5 MG/ML
INJECTION INTRAMUSCULAR; INTRAVENOUS AS NEEDED
Status: DISCONTINUED | OUTPATIENT
Start: 2024-06-26 | End: 2024-06-26 | Stop reason: SURG

## 2024-06-26 RX ORDER — ACETAMINOPHEN 325 MG/1
650 TABLET ORAL EVERY 6 HOURS
Status: DISCONTINUED | OUTPATIENT
Start: 2024-06-27 | End: 2024-06-29 | Stop reason: HOSPADM

## 2024-06-26 RX ORDER — OXYTOCIN/0.9 % SODIUM CHLORIDE 30/500 ML
125 PLASTIC BAG, INJECTION (ML) INTRAVENOUS ONCE AS NEEDED
Status: DISCONTINUED | OUTPATIENT
Start: 2024-06-26 | End: 2024-06-29 | Stop reason: HOSPADM

## 2024-06-26 RX ORDER — FENTANYL CITRATE 50 UG/ML
INJECTION, SOLUTION INTRAMUSCULAR; INTRAVENOUS AS NEEDED
Status: DISCONTINUED | OUTPATIENT
Start: 2024-06-26 | End: 2024-06-26 | Stop reason: SURG

## 2024-06-26 RX ORDER — PROMETHAZINE HYDROCHLORIDE 12.5 MG/1
12.5 TABLET ORAL EVERY 6 HOURS PRN
Status: DISCONTINUED | OUTPATIENT
Start: 2024-06-26 | End: 2024-06-26 | Stop reason: HOSPADM

## 2024-06-26 RX ORDER — ACETAMINOPHEN 500 MG
1000 TABLET ORAL ONCE
Status: COMPLETED | OUTPATIENT
Start: 2024-06-26 | End: 2024-06-26

## 2024-06-26 RX ORDER — SODIUM CHLORIDE 0.9 % (FLUSH) 0.9 %
3-10 SYRINGE (ML) INJECTION AS NEEDED
Status: DISCONTINUED | OUTPATIENT
Start: 2024-06-26 | End: 2024-06-29

## 2024-06-26 RX ORDER — LIDOCAINE HYDROCHLORIDE 10 MG/ML
0.5 INJECTION, SOLUTION EPIDURAL; INFILTRATION; INTRACAUDAL; PERINEURAL ONCE AS NEEDED
Status: DISCONTINUED | OUTPATIENT
Start: 2024-06-26 | End: 2024-06-26 | Stop reason: HOSPADM

## 2024-06-26 RX ORDER — OXYCODONE HYDROCHLORIDE 5 MG/1
5 TABLET ORAL EVERY 4 HOURS PRN
Status: DISCONTINUED | OUTPATIENT
Start: 2024-06-26 | End: 2024-06-29 | Stop reason: HOSPADM

## 2024-06-26 RX ORDER — METHYLERGONOVINE MALEATE 0.2 MG/ML
200 INJECTION INTRAVENOUS AS NEEDED
Status: DISCONTINUED | OUTPATIENT
Start: 2024-06-26 | End: 2024-06-29 | Stop reason: HOSPADM

## 2024-06-26 RX ORDER — PRENATAL VIT/IRON FUM/FOLIC AC 27MG-0.8MG
1 TABLET ORAL DAILY
Status: DISCONTINUED | OUTPATIENT
Start: 2024-06-26 | End: 2024-06-29 | Stop reason: HOSPADM

## 2024-06-26 RX ORDER — ONDANSETRON 2 MG/ML
4 INJECTION INTRAMUSCULAR; INTRAVENOUS EVERY 6 HOURS PRN
Status: DISCONTINUED | OUTPATIENT
Start: 2024-06-26 | End: 2024-06-29 | Stop reason: HOSPADM

## 2024-06-26 RX ORDER — KETOROLAC TROMETHAMINE 15 MG/ML
15 INJECTION, SOLUTION INTRAMUSCULAR; INTRAVENOUS EVERY 6 HOURS
Status: COMPLETED | OUTPATIENT
Start: 2024-06-26 | End: 2024-06-27

## 2024-06-26 RX ORDER — PROMETHAZINE HYDROCHLORIDE 12.5 MG/1
12.5 TABLET ORAL EVERY 4 HOURS PRN
Status: DISCONTINUED | OUTPATIENT
Start: 2024-06-26 | End: 2024-06-29 | Stop reason: HOSPADM

## 2024-06-26 RX ORDER — MISOPROSTOL 200 UG/1
800 TABLET ORAL AS NEEDED
Status: DISCONTINUED | OUTPATIENT
Start: 2024-06-26 | End: 2024-06-26 | Stop reason: HOSPADM

## 2024-06-26 RX ORDER — CITRIC ACID/SODIUM CITRATE 334-500MG
30 SOLUTION, ORAL ORAL ONCE
Status: COMPLETED | OUTPATIENT
Start: 2024-06-26 | End: 2024-06-26

## 2024-06-26 RX ORDER — MISOPROSTOL 200 UG/1
800 TABLET ORAL AS NEEDED
Status: DISCONTINUED | OUTPATIENT
Start: 2024-06-26 | End: 2024-06-29 | Stop reason: HOSPADM

## 2024-06-26 RX ORDER — DIPHENHYDRAMINE HCL 25 MG
25 CAPSULE ORAL EVERY 4 HOURS PRN
Status: DISCONTINUED | OUTPATIENT
Start: 2024-06-26 | End: 2024-06-29 | Stop reason: HOSPADM

## 2024-06-26 RX ORDER — OXYTOCIN/0.9 % SODIUM CHLORIDE 30/500 ML
30 PLASTIC BAG, INJECTION (ML) INTRAVENOUS ONCE
Status: COMPLETED | OUTPATIENT
Start: 2024-06-26 | End: 2024-06-26

## 2024-06-26 RX ORDER — SODIUM CHLORIDE 9 MG/ML
40 INJECTION, SOLUTION INTRAVENOUS AS NEEDED
Status: DISCONTINUED | OUTPATIENT
Start: 2024-06-26 | End: 2024-06-26

## 2024-06-26 RX ORDER — FAMOTIDINE 10 MG/ML
INJECTION, SOLUTION INTRAVENOUS AS NEEDED
Status: DISCONTINUED | OUTPATIENT
Start: 2024-06-26 | End: 2024-06-26 | Stop reason: SURG

## 2024-06-26 RX ORDER — HYDROCORTISONE 25 MG/G
1 CREAM TOPICAL AS NEEDED
Status: DISCONTINUED | OUTPATIENT
Start: 2024-06-26 | End: 2024-06-29 | Stop reason: HOSPADM

## 2024-06-26 RX ORDER — CARBOPROST TROMETHAMINE 250 UG/ML
250 INJECTION, SOLUTION INTRAMUSCULAR AS NEEDED
Status: DISCONTINUED | OUTPATIENT
Start: 2024-06-26 | End: 2024-06-29 | Stop reason: HOSPADM

## 2024-06-26 RX ORDER — SODIUM CHLORIDE, SODIUM LACTATE, POTASSIUM CHLORIDE, CALCIUM CHLORIDE 600; 310; 30; 20 MG/100ML; MG/100ML; MG/100ML; MG/100ML
125 INJECTION, SOLUTION INTRAVENOUS CONTINUOUS
Status: DISCONTINUED | OUTPATIENT
Start: 2024-06-26 | End: 2024-06-29 | Stop reason: HOSPADM

## 2024-06-26 RX ORDER — MIDAZOLAM HYDROCHLORIDE 1 MG/ML
INJECTION INTRAMUSCULAR; INTRAVENOUS AS NEEDED
Status: DISCONTINUED | OUTPATIENT
Start: 2024-06-26 | End: 2024-06-26 | Stop reason: SURG

## 2024-06-26 RX ORDER — ALUMINA, MAGNESIA, AND SIMETHICONE 2400; 2400; 240 MG/30ML; MG/30ML; MG/30ML
15 SUSPENSION ORAL EVERY 4 HOURS PRN
Status: DISCONTINUED | OUTPATIENT
Start: 2024-06-26 | End: 2024-06-29 | Stop reason: HOSPADM

## 2024-06-26 RX ORDER — DIPHENHYDRAMINE HYDROCHLORIDE 50 MG/ML
25 INJECTION INTRAMUSCULAR; INTRAVENOUS ONCE AS NEEDED
Status: DISCONTINUED | OUTPATIENT
Start: 2024-06-26 | End: 2024-06-29 | Stop reason: HOSPADM

## 2024-06-26 RX ORDER — DOCUSATE SODIUM 100 MG/1
100 CAPSULE, LIQUID FILLED ORAL 2 TIMES DAILY PRN
Status: DISCONTINUED | OUTPATIENT
Start: 2024-06-26 | End: 2024-06-27

## 2024-06-26 RX ORDER — TERBUTALINE SULFATE 1 MG/ML
0.25 INJECTION, SOLUTION SUBCUTANEOUS AS NEEDED
Status: DISCONTINUED | OUTPATIENT
Start: 2024-06-26 | End: 2024-06-26

## 2024-06-26 RX ORDER — ONDANSETRON 4 MG/1
4 TABLET, ORALLY DISINTEGRATING ORAL EVERY 6 HOURS PRN
Status: DISCONTINUED | OUTPATIENT
Start: 2024-06-26 | End: 2024-06-29 | Stop reason: HOSPADM

## 2024-06-26 RX ORDER — ENOXAPARIN SODIUM 100 MG/ML
40 INJECTION SUBCUTANEOUS EVERY 12 HOURS
Status: DISCONTINUED | OUTPATIENT
Start: 2024-06-27 | End: 2024-06-29 | Stop reason: HOSPADM

## 2024-06-26 RX ORDER — SODIUM CHLORIDE 0.9 % (FLUSH) 0.9 %
10 SYRINGE (ML) INJECTION AS NEEDED
Status: DISCONTINUED | OUTPATIENT
Start: 2024-06-26 | End: 2024-06-26

## 2024-06-26 RX ORDER — ONDANSETRON 2 MG/ML
4 INJECTION INTRAMUSCULAR; INTRAVENOUS EVERY 6 HOURS PRN
Status: DISCONTINUED | OUTPATIENT
Start: 2024-06-26 | End: 2024-06-26 | Stop reason: HOSPADM

## 2024-06-26 RX ORDER — IBUPROFEN 600 MG/1
600 TABLET ORAL EVERY 6 HOURS
Status: DISCONTINUED | OUTPATIENT
Start: 2024-06-27 | End: 2024-06-29 | Stop reason: HOSPADM

## 2024-06-26 RX ORDER — METHYLERGONOVINE MALEATE 0.2 MG/ML
200 INJECTION INTRAVENOUS ONCE AS NEEDED
Status: DISCONTINUED | OUTPATIENT
Start: 2024-06-26 | End: 2024-06-26 | Stop reason: HOSPADM

## 2024-06-26 RX ORDER — SIMETHICONE 80 MG
80 TABLET,CHEWABLE ORAL 4 TIMES DAILY PRN
Status: DISCONTINUED | OUTPATIENT
Start: 2024-06-26 | End: 2024-06-29 | Stop reason: HOSPADM

## 2024-06-26 RX ORDER — DIPHENHYDRAMINE HYDROCHLORIDE 50 MG/ML
25 INJECTION INTRAMUSCULAR; INTRAVENOUS EVERY 4 HOURS PRN
Status: DISCONTINUED | OUTPATIENT
Start: 2024-06-26 | End: 2024-06-29 | Stop reason: HOSPADM

## 2024-06-26 RX ORDER — BUPIVACAINE HYDROCHLORIDE 7.5 MG/ML
INJECTION, SOLUTION INTRASPINAL AS NEEDED
Status: DISCONTINUED | OUTPATIENT
Start: 2024-06-26 | End: 2024-06-26 | Stop reason: SURG

## 2024-06-26 RX ORDER — CARBOPROST TROMETHAMINE 250 UG/ML
250 INJECTION, SOLUTION INTRAMUSCULAR AS NEEDED
Status: DISCONTINUED | OUTPATIENT
Start: 2024-06-26 | End: 2024-06-26 | Stop reason: HOSPADM

## 2024-06-26 RX ORDER — ACETAMINOPHEN 500 MG
1000 TABLET ORAL EVERY 6 HOURS
Status: COMPLETED | OUTPATIENT
Start: 2024-06-26 | End: 2024-06-27

## 2024-06-26 RX ADMIN — SODIUM CHLORIDE, POTASSIUM CHLORIDE, SODIUM LACTATE AND CALCIUM CHLORIDE 125 ML/HR: 600; 310; 30; 20 INJECTION, SOLUTION INTRAVENOUS at 09:55

## 2024-06-26 RX ADMIN — ACETAMINOPHEN 1000 MG: 500 TABLET ORAL at 18:35

## 2024-06-26 RX ADMIN — BUPIVACAINE HYDROCHLORIDE IN DEXTROSE 1.3 ML: 7.5 INJECTION, SOLUTION SUBARACHNOID at 13:08

## 2024-06-26 RX ADMIN — SODIUM CHLORIDE 2000 MG: 900 INJECTION INTRAVENOUS at 12:42

## 2024-06-26 RX ADMIN — SODIUM CHLORIDE, POTASSIUM CHLORIDE, SODIUM LACTATE AND CALCIUM CHLORIDE 1000 ML: 600; 310; 30; 20 INJECTION, SOLUTION INTRAVENOUS at 12:50

## 2024-06-26 RX ADMIN — MIDAZOLAM HYDROCHLORIDE 1 MG: 1 INJECTION, SOLUTION INTRAMUSCULAR; INTRAVENOUS at 13:02

## 2024-06-26 RX ADMIN — KETOROLAC TROMETHAMINE 30 MG: 30 INJECTION, SOLUTION INTRAMUSCULAR; INTRAVENOUS at 15:22

## 2024-06-26 RX ADMIN — Medication 1 APPLICATION: at 17:44

## 2024-06-26 RX ADMIN — SODIUM CITRATE AND CITRIC ACID MONOHYDRATE 30 ML: 500; 334 SOLUTION ORAL at 12:55

## 2024-06-26 RX ADMIN — SIMETHICONE 80 MG: 80 TABLET, CHEWABLE ORAL at 21:58

## 2024-06-26 RX ADMIN — SODIUM CHLORIDE 2000 MG: 900 INJECTION INTRAVENOUS at 12:56

## 2024-06-26 RX ADMIN — OXYCODONE HYDROCHLORIDE 10 MG: 10 TABLET ORAL at 16:35

## 2024-06-26 RX ADMIN — KETOROLAC TROMETHAMINE 15 MG: 15 INJECTION, SOLUTION INTRAMUSCULAR; INTRAVENOUS at 21:58

## 2024-06-26 RX ADMIN — METHYLERGONOVINE MALEATE 200 MCG: 0.2 INJECTION, SOLUTION INTRAMUSCULAR; INTRAVENOUS at 13:25

## 2024-06-26 RX ADMIN — DOCUSATE SODIUM 100 MG: 100 CAPSULE, LIQUID FILLED ORAL at 21:58

## 2024-06-26 RX ADMIN — SODIUM CHLORIDE, POTASSIUM CHLORIDE, SODIUM LACTATE AND CALCIUM CHLORIDE: 600; 310; 30; 20 INJECTION, SOLUTION INTRAVENOUS at 13:33

## 2024-06-26 RX ADMIN — MIDAZOLAM HYDROCHLORIDE 1 MG: 1 INJECTION, SOLUTION INTRAMUSCULAR; INTRAVENOUS at 13:33

## 2024-06-26 RX ADMIN — METOCLOPRAMIDE 10 MG: 5 INJECTION, SOLUTION INTRAMUSCULAR; INTRAVENOUS at 13:02

## 2024-06-26 RX ADMIN — Medication 999 ML/HR: at 13:30

## 2024-06-26 RX ADMIN — ACETAMINOPHEN 1000 MG: 500 TABLET ORAL at 12:40

## 2024-06-26 RX ADMIN — OXYCODONE HYDROCHLORIDE 10 MG: 10 TABLET ORAL at 20:38

## 2024-06-26 RX ADMIN — SODIUM CHLORIDE, POTASSIUM CHLORIDE, SODIUM LACTATE AND CALCIUM CHLORIDE 125 ML/HR: 600; 310; 30; 20 INJECTION, SOLUTION INTRAVENOUS at 15:11

## 2024-06-26 RX ADMIN — FAMOTIDINE 20 MG: 10 INJECTION, SOLUTION INTRAVENOUS at 13:02

## 2024-06-26 RX ADMIN — MORPHINE SULFATE 150 MCG: 0.5 INJECTION, SOLUTION EPIDURAL; INTRATHECAL; INTRAVENOUS at 13:08

## 2024-06-26 RX ADMIN — FENTANYL CITRATE 20 MCG: 50 INJECTION, SOLUTION INTRAMUSCULAR; INTRAVENOUS at 13:08

## 2024-06-26 RX ADMIN — Medication 500 ML: at 13:34

## 2024-06-26 RX ADMIN — ONDANSETRON 4 MG: 2 INJECTION INTRAMUSCULAR; INTRAVENOUS at 13:02

## 2024-06-26 NOTE — ANESTHESIA PREPROCEDURE EVALUATION
Anesthesia Evaluation     Patient summary reviewed and Nursing notes reviewed   NPO Solid Status: > 8 hours  NPO Liquid Status: > 2 hours           Airway   Mallampati: II  TM distance: >3 FB  Neck ROM: full  Dental      Pulmonary - negative pulmonary ROS   Cardiovascular - negative cardio ROS        Neuro/Psych- negative ROS  GI/Hepatic/Renal/Endo    (+) morbid obesity    Musculoskeletal (-) negative ROS    Abdominal    Substance History - negative use     OB/GYN    (+) Pregnant        Other - negative ROS                   Anesthesia Plan    ASA 3     ITN and spinal       Anesthetic plan, risks, benefits, and alternatives have been provided, discussed and informed consent has been obtained with: patient.    Use of blood products discussed with patient .    Plan discussed with attending.    CODE STATUS:    Level Of Support Discussed With: Patient  Code Status (Patient has no pulse and is not breathing): CPR (Attempt to Resuscitate)  Medical Interventions (Patient has pulse or is breathing): Full Support

## 2024-06-26 NOTE — PAYOR COMM NOTE
"Neema Jett (28 y.o. Female)     [employer] ID#[id number]     Delivery Information:  C Section 6/26/24  Girl A born @ 13:21  Wt 2551 gms  Apgars 8/9  Baby B born @ 13:22  Wt 2205 gms  Apgars 8/9    From:Inez Nguyen LPN, Utilization Review  Phone #[phone]  Fax #[phone]        Date of Birth   1995    Social Security Number       Address   [address]  [place] [id number]    Home Phone   [phone]    MRN   [id number]       Yazdanism   None    Marital Status                               Admission Date   6/26/24    Admission Type   Elective    Admitting Provider   Virginia Mensah MD    Attending Provider   Virginia Mensah MD    Department, Room/Bed   [facility] LABOR DELIVERY, [facility]7/7       Discharge Date       Discharge Disposition       Discharge Destination                                 Attending Provider: Virginia Mensah MD    Allergies: No Known Allergies    Isolation: None   Infection: None   Code Status: CPR    Ht: 149.9 cm (59\")   Wt: 95.3 kg (210 lb)    Admission Cmt: None   Principal Problem: Term pregnancy [Z34.90]                   Active Insurance as of 6/26/2024       Primary Coverage       Payor Plan Insurance Group Employer/Plan Group    [employer] [place] [employer] [place]        Payor Plan Address Payor Plan Phone Number Payor Plan Fax Number Effective Dates    [address] [address] [phone]  4/21/2022 - None Entered    [facility] [id number]         Subscriber Name Subscriber Birth Date Member ID       NEEMA JETT 1995 [id number]                     Emergency Contacts        (Rel.) Home Phone Work Phone Mobile Phone    Elba Bergman (Mother) -- -- [phone]    NIRAJ JETT (Spouse) -- -- [phone]              Insurance Information                  [facility] MEDICAID Phone: [phone]    Subscriber: Neema Jett Subscriber#: [id number]    Group#: -- Precert#: --             History & Physical    "     Virginia Mensah MD at 24 1057          [facility]  Obstetric History and Physical    CC: Twin pregnancy    SUBJECTIVE:     Patient is a 28 y.o. female  currently at 37w1d, who presents with pregnancy c/b DC/DA Twin pregnancy with A2DM managed with insulin. She was admitted for IOL this am but has now decided she would prefer to proceed with PCD. She has been NPO since last night.       Prenatal Information:  Prenatal Results       Initial Prenatal Labs       Test Value Reference Range Date Time    Hemoglobin  9.7 g/dL 12.0 - 15.9 24 1637    Hematocrit  29.6 % 34.0 - 46.6 24 1637    Platelets  340 10*3/mm3 140 - 450 24 1637    Rubella IgG  1.70 index Immune >0.99 24 1637    Hepatitis B SAg  Non-Reactive  Non-Reactive 24 1637    Hepatitis C Ab  Non-Reactive  Non-Reactive 24 1637    RPR        T. Pallidum Ab         ABO  O   24 1637    Rh  Positive   24 1637    Antibody Screen  Negative   24 1637    HIV  Non-Reactive  Non-Reactive 24 1637    Urine Culture  25,000 CFU/mL Normal Urogenital Sendy   24 1637    Gonorrhea  Negative  Negative 24 1637    Chlamydia  Negative  Negative 24 1637    TSH        HgB A1c   5.5 % 4.5 - 5.7 24 0832       5.70 % 4.80 - 5.60 24 1637    Varicella IgG        Hemoglobinopathy Fractionation        Hemoglobinopathy (genetic testing)        Cystic fibrosis                   Fetal testing        Test Value Reference Range Date Time    NIPT        MSAFP        AFP-4                  2nd and 3rd Trimester       Test Value Reference Range Date Time    Hemoglobin (repeated)  10.8 g/dL 12.0 - 15.9 24 0959    Hematocrit (repeated)  31.6 % 34.0 - 46.6 24 0959    Platelets   183 10*3/mm3 140 - 450 24 0959       340 10*3/mm3 140 - 450 24 1637    1 hour GTT   214 mg/dL 74 - 180 24 1022       152 mg/dL 65 - 139 24 1043    Antibody Screen (repeated)        3rd TM  syphilis scrn (repeated)  RPR         3rd TM syphilis scrn (repeated) FTA        GTT Fasting        GTT 1 Hr        GTT 2 Hr        GTT 3 Hr  100 mg/dL 74 - 140 04/05/24 1223    Group B Strep                  Other testing        Test Value Reference Range Date Time    Parvo IgG         CMV IgG                   Drug Screening       Test Value Reference Range Date Time    Amphetamine Screen  Negative  Negative 02/07/24 1637    Barbiturate Screen  Negative  Negative 02/07/24 1637    Benzodiazepine Screen  Negative  Negative 02/07/24 1637    Methadone Screen  Negative  Negative 02/07/24 1637    Phencyclidine Screen  Negative  Negative 02/07/24 1637    Opiates Screen  Negative  Negative 02/07/24 1637    THC Screen  Negative  Negative 02/07/24 1637    Cocaine Screen  Negative  Negative 02/07/24 1637    Propoxyphene Screen        Buprenorphine Screen  Negative  Negative 02/07/24 1637    Methamphetamine Screen  Negative  Negative 02/07/24 1637    Oxycodone Screen  Negative  Negative 02/07/24 1637    Tricyclic Antidepressants Screen  Negative  Negative 02/07/24 1637              Legend    ^: Historical                          External Prenatal Results       Pregnancy Outside Results - Transcribed From Office Records - See Scanned Records For Details       Test Value Date Time    ABO  O  02/07/24 1637    Rh  Positive  02/07/24 1637    Antibody Screen  Negative  02/07/24 1637    Varicella IgG       Rubella  1.70 index 02/07/24 1637    Hgb  10.8 g/dL 06/26/24 0959       9.7 g/dL 02/07/24 1637    Hct  31.6 % 06/26/24 0959       29.6 % 02/07/24 1637    HgB A1c   5.5 % 05/09/24 0832       5.70 % 02/07/24 1637    1h GTT  214 mg/dL 04/05/24 1022       152 mg/dL 03/26/24 1043    3h GTT Fasting       3h GTT 1 hour       3h GTT 2 hour       3h GTT 3 hour   100 mg/dL 04/05/24 1223    Gonorrhea (discrete)  Negative  02/07/24 1637    Chlamydia (discrete)  Negative  02/07/24 1637    RPR       Syphilis Antibody       HBsAg  Non-Reactive   24 1637    Herpes Simplex Virus PCR       Herpes Simplex VIrus Culture       HIV  Non-Reactive  24 1637    Hep C RNA Quant PCR       Hep C Antibody  Non-Reactive  24 1637    AFP       NIPT       Cystic Fibroisis        Group B Strep       GBS Susceptibility to Clindamycin       GBS Susceptibility to Erythromycin       Fetal Fibronectin       Genetic Testing, Maternal Blood                 Drug Screening       Test Value Date Time    Urine Drug Screen       Amphetamine Screen  Negative  24 1637    Barbiturate Screen  Negative  24 1637    Benzodiazepine Screen  Negative  24 1637    Methadone Screen  Negative  24 1637    Phencyclidine Screen  Negative  24 1637    Opiates Screen  Negative  24 1637    THC Screen  Negative  24 1637    Cocaine Screen       Propoxyphene Screen       Buprenorphine Screen  Negative  24 1637    Methamphetamine Screen       Oxycodone Screen  Negative  24 1637    Tricyclic Antidepressants Screen  Negative  24 1637              Legend    ^: Historical                             OB History:                     OB History    Para Term  AB Living   4 2 2 0 1 2   SAB IAB Ectopic Molar Multiple Live Births   1 0 0 0 0 2      # Outcome Date GA Lbr Edmar/2nd Weight Sex Type Anes PTL Lv   4 Current            3 SAB 2014     SAB      2 Term 13   3289 g (7 lb 4 oz) M Vag-Spont EPI N BRIGIDA   1 Term 11   3374 g (7 lb 7 oz) F Vag-Spont EPI N BRIGIDA      Name: Lzu      Allergies:                      No Known Allergies   Past Medical History: Past Medical History:   Diagnosis Date    Anxiety     Cholelithiasis 2016    Depression     Dislocation of right knee     Pap smear for cervical cancer screening 2016      Past Surgical History Past Surgical History:   Procedure Laterality Date    CHOLECYSTECTOMY  2016    KNEE SURGERY Right 2014    PATELLAR TENDON GRAFT IMPLANTATION  2014    VAGINAL DELIVERY       x2    WISDOM TOOTH EXTRACTION        Family History: Family History   Problem Relation Age of Onset    Hypertension Mother     Diabetes Father     Diabetes Maternal Grandmother     Hypertension Maternal Grandmother     Diabetes Paternal Grandmother       Social History:  reports that she has never smoked. She has never been exposed to tobacco smoke. She has never used smokeless tobacco.   reports current alcohol use of about 2.0 standard drinks of alcohol per week.   reports no history of drug use.    General ROS: Pertinent items are noted in HPI, all other systems reviewed and negative   Medications: Insulin Glargine, Insulin Lispro (1 Unit Dial), Ketone Test, Lancets, Pen Needles, cetirizine, docusate sodium, ferrous sulfate, fluticasone, glucose blood, polyethylene glycol, and prenatal vitamin 28-0.8       Objective      Vital Signs Range for the last 24 hours  Temperature: Temp:  [98.1 °F (36.7 °C)] 98.1 °F (36.7 °C)   BP: BP: (155)/(92) 155/92   Pulse: Heart Rate:  [67] 67   Respirations: Resp:  [16] 16   SPO2:                 Physical Examination: General appearance - alert, well appearing, and in no distress  Chest - clear to auscultation, no wheezes, rales or rhonchi, symmetric air entry  Heart - normal rate, regular rhythm, normal S1, S2, no murmurs, rubs, clicks or gallops  Abdomen - Soft, gravid, nontender  Extremities - pedal edema tr +      Presentation: VTX/Transverse on US   Cervix: Exam by: Method: sterile exam per CNM   Dilation: Cervical Dilation (cm): 1   Effacement:     Station:         Fetal Heart Rate Assessment           Baseline:  normal   Variability:  moderate   Accels:  present   Decels:  absent   Tracing Category:  1 x2     Uterine Assessment   Method: Method: external tocotransducer, palpation   Frequency (min):     Ctx Count in 10 min:       Laboratory Results:  WBC   Date Value Ref Range Status   06/26/2024 7.28 3.40 - 10.80 10*3/mm3 Final     RBC   Date Value Ref Range Status    2024 3.61 (L) 3.77 - 5.28 10*6/mm3 Final     Hemoglobin   Date Value Ref Range Status   2024 10.8 (L) 12.0 - 15.9 g/dL Final     Hematocrit   Date Value Ref Range Status   2024 31.6 (L) 34.0 - 46.6 % Final     MCV   Date Value Ref Range Status   2024 87.5 79.0 - 97.0 fL Final     MCH   Date Value Ref Range Status   2024 29.9 26.6 - 33.0 pg Final     MCHC   Date Value Ref Range Status   2024 34.2 31.5 - 35.7 g/dL Final     RDW   Date Value Ref Range Status   2024 15.2 12.3 - 15.4 % Final     RDW-SD   Date Value Ref Range Status   2024 47.8 37.0 - 54.0 fl Final     MPV   Date Value Ref Range Status   2024 10.5 6.0 - 12.0 fL Final     Platelets   Date Value Ref Range Status   2024 183 140 - 450 10*3/mm3 Final             Assessment/Plan:   IUP 37w1d with DC/DA Twins and A2DM    1.Admit and proceed with delivery  2.long discussion had over several hours about mode of delivery. Discussion included r/b of CD and  with possible breech extraction. She has elected to proceed with PCD  3.FWB reassuring x2  4. A2DM: FSBS q4 hr for now  5. Desires sterilization. Consents had been signed in office. Will proceed with this during CD  6. Tylenol, ancef, bicngaa      Virginia Mensah MD  2024  10:59 EDT      Electronically signed by Virginia Mensah MD at 24 1104       Facility-Administered Medications as of 2024   Medication Dose Route Frequency Provider Last Rate Last Admin    [COMPLETED] acetaminophen (TYLENOL) tablet 1,000 mg  1,000 mg Oral Once Virginia Mensah MD   1,000 mg at 24 1240    carboprost (HEMABATE) injection 250 mcg  250 mcg Intramuscular PRN Virginia Mensah MD        [COMPLETED] ceFAZolin 2000 mg IVPB in 100 mL NS (MBP)  2,000 mg Intravenous Once Virginia Mensah MD   2,000 mg at 24 1256    cetirizine (zyrTEC) tablet 10 mg  10 mg Oral Daily Virginia Mensah MD        diphenhydrAMINE (BENADRYL) capsule 25 mg  25 mg  "Oral Q4H PRN Zuleima Del Castillo CRNA        Or    diphenhydrAMINE (BENADRYL) injection 25 mg  25 mg Intravenous Once PRN Zuleima Del Castillo CRNA        Or    diphenhydrAMINE (BENADRYL) injection 25 mg  25 mg Intramuscular Q4H PRN Zuleima Del Castillo CRNA        [COMPLETED] lactated ringers bolus 1,000 mL  1,000 mL Intravenous Once Virginia Mensah MD 4,000 mL/hr at 06/26/24 1250 1,000 mL at 06/26/24 1250    lactated ringers infusion  125 mL/hr Intravenous Continuous Virginia Mensah  mL/hr at 06/26/24 0955 New Bag at 06/26/24 1333    lidocaine PF 1% (XYLOCAINE) injection 0.5 mL  0.5 mL Intradermal Once PRN Virginia Mensah MD        methylergonovine (METHERGINE) injection 200 mcg  200 mcg Intramuscular Once PRN Virginia Mensah MD        miSOPROStol (CYTOTEC) tablet 800 mcg  800 mcg Rectal PRN Virginia Mensah MD        ondansetron ODT (ZOFRAN-ODT) disintegrating tablet 4 mg  4 mg Oral Q6H PRN Virginia Mensah MD        Or    ondansetron (ZOFRAN) injection 4 mg  4 mg Intravenous Q6H PRN Virginia Mensah MD        oxytocin (PITOCIN) 30 units in 0.9% sodium chloride 500 mL (premix)  30 Units Intravenous Once Virginia Mensah MD        Followed by    oxytocin (PITOCIN) 30 units in 0.9% sodium chloride 500 mL (premix)  30 Units Intravenous Continuous Virginia Mensah MD        promethazine (PHENERGAN) suppository 12.5 mg  12.5 mg Rectal Q6H PRN Virginia Mensah MD        Or    promethazine (PHENERGAN) tablet 12.5 mg  12.5 mg Oral Q6H PRN Virginia Mensah MD        [COMPLETED] Sod Citrate-Citric Acid (BICITRA) oral solution 30 mL  30 mL Oral Once Virginia Mensah MD   30 mL at 06/26/24 1255    sodium chloride 0.9 % flush 10 mL  10 mL Intravenous Q12H Virginia Mensah MD         Orders (last 24 hrs)        Start     Ordered    06/26/24 1615  oxytocin (PITOCIN) 30 units in 0.9% sodium chloride 500 mL (premix)  Continuous        Placed in \"Followed by\" Linked Group    " "06/26/24 1501    06/26/24 1600  Respirations  Every Hour      Comments: If respiratory rate is less than 10/min, notify the Anesthesiologist    06/26/24 1500    06/26/24 1600  oxytocin (PITOCIN) 30 units in 0.9% sodium chloride 500 mL (premix)  Once        Placed in \"Followed by\" Linked Group    06/26/24 1501    06/26/24 1502  Diet: Regular/House; Fluid Consistency: Thin (IDDSI 0)  Diet Effective Now         06/26/24 1501    06/26/24 1502  Blood Gas, Arterial, Cord  Once        Comments: If requested by provider at the time of delivery      06/26/24 1501    06/26/24 1502  Blood Gas, Venous, Cord  Once        Comments: If requested by provider at time of delivery      06/26/24 1501    06/26/24 1501  methylergonovine (METHERGINE) injection 200 mcg  Once As Needed         06/26/24 1501    06/26/24 1501  carboprost (HEMABATE) injection 250 mcg  As Needed         06/26/24 1501    06/26/24 1501  miSOPROStol (CYTOTEC) tablet 800 mcg  As Needed         06/26/24 1501    06/26/24 1501  Vital Signs  Per Hospital Policy         06/26/24 1500    06/26/24 1501  If Respiratory Rate is Less Than 8/Min, See Narcan Order. Notify Anesthesiologist STAT.  Continuous         06/26/24 1500    06/26/24 1501  Blood Pressure and Pulse Every 4 Hours  Continuous         06/26/24 1500    06/26/24 1501  Activity & Removal of Barbosa Catheter, Per Obstetrician  Continuous         06/26/24 1500    06/26/24 1501  Notify Anesthesiologist for Any Questions / Problems  Continuous         06/26/24 1500    06/26/24 1501  Notify Anesthesia for Temp Over 101.4F  Continuous        Comments: May discharge from PACU with temperature at or above 95 degrees.    06/26/24 1500    06/26/24 1501  Ambu Bag at Bedside  Continuous         06/26/24 1500    06/26/24 1500  diphenhydrAMINE (BENADRYL) capsule 25 mg  Every 4 Hours PRN        Placed in \"Or\" Linked Group    06/26/24 1500    06/26/24 1500  diphenhydrAMINE (BENADRYL) injection 25 mg  Once As Needed        Placed " "in \"Or\" Linked Group    06/26/24 1500    06/26/24 1500  diphenhydrAMINE (BENADRYL) injection 25 mg  Every 4 Hours PRN        Placed in \"Or\" Linked Group    06/26/24 1500    06/26/24 1337  Tissue Pathology Exam  Once         06/26/24 1336    06/26/24 1245  cetirizine (zyrTEC) tablet 10 mg  Daily         06/26/24 1155    06/26/24 1200  Vital Signs q 4 while awake  Every 4 Hours      Comments: While the patient is awake.    06/26/24 0856    06/26/24 1200  POC Glucose Finger Q4H  Every 4 Hours      Comments: Complete no more than 45 minutes prior to patient eating      06/26/24 0856    06/26/24 1145  acetaminophen (TYLENOL) tablet 1,000 mg  Once         06/26/24 1055    06/26/24 1145  Sod Citrate-Citric Acid (BICITRA) oral solution 30 mL  Once         06/26/24 1056    06/26/24 1145  ceFAZolin 2000 mg IVPB in 100 mL NS (MBP)  Once         06/26/24 1056    06/26/24 0945  sodium chloride 0.9 % flush 10 mL  Every 12 Hours Scheduled         06/26/24 0856    06/26/24 0945  lactated ringers bolus 1,000 mL  Once         06/26/24 0856    06/26/24 0945  lactated ringers infusion  Continuous         06/26/24 0856    06/26/24 0945  oxytocin (PITOCIN) 30 units in 0.9% sodium chloride 500 mL (premix)  Titrated,   Status:  Discontinued         06/26/24 0856    06/26/24 0945  mineral oil liquid 30 mL  Once,   Status:  Discontinued         06/26/24 0856    06/26/24 0939  POC Glucose Once  PROCEDURE ONCE        Comments: Complete no more than 45 minutes prior to patient eating      06/26/24 0938    06/26/24 0857  Admit To Obstetrics Inpatient  Once         06/26/24 0856    06/26/24 0857  Code Status and Medical Interventions:  Continuous         06/26/24 0856    06/26/24 0857  Obtain informed consent  Once         06/26/24 0856    06/26/24 0857  VTE Prophylaxis Not Indicated: Low Risk; Obesity - BMI >30 (1)  Once         06/26/24 0856    06/26/24 0857  Vital Signs Per hospital policy  Per Hospital Policy         06/26/24 0856    06/26/24 " 08  Continuous Fetal Monitoring With NST on Admission and Prior to Initiation of Oxytocin.  Per Order Details        Comments: Continuous Fetal Monitoring With NST on Admission & Prior to Initiation of Oxytocin.    24  External Uterine Contraction Monitoring  Per Hospital Policy         24 0856    24  Notify Physician (specified)  Until Discontinued         24 0856    24  Notify physician for tachysystole (per hospital algorithm)  Until Discontinued         24 0856    24  Notify physician if membranes ruptured, bleeding greater than 1 pad an hour, fetal heart tone abnormality, and severe pain  Until Discontinued         24 0856    24  Up Ad Shruthi  Until Discontinued         24  Cervical Exam  Once        Comments: Unless contraindicated, every 1-2 hours in active labor, or at nurse's discretion.    24  Initiate Group Beta Strep (GBS) Prophylaxis Protocol, If Criteria Met  Continuous        Comments: NO TREATMENT RECOMMENDED IF: 1)  Maternal GBS status known negative 2)  Scheduled  birth with intact membranes, not in labor.  3 ) Maternal GBS unknown, no risk factors.   TREAT WITH ANTIBIOTICS IF:  1)  Maternal GBS status is known postive.  2)  Maternal GBS status unknown with these risk factors:  a)  Previous infant affected by GBS infection.  b)  GBS urinary tract infection (UTI) or bacteruria during pregnancy  c)  Unexplained maternal fever in labor (greater than or equal to 100.4F or 38.0C)  d)  Prolonged rupture of the membranes greater than or equal to 18 hours.  e)  Gestational age less than 37 weeks.    24  Assess Need for Indwelling Urinary Catheter - Follow Removal Protocol  Continuous        Comments: Indwelling Urinary Catheter Removal Criteria  Discontinue Indwelling Urinary Catheter Unless One of the Following is  "Present:  Urinary Retention or Obstruction  Chronic Urinary Catheter Use  End of Life  Critical Illness with Strict I/O   Tract or Abdominal Surgery  Stage 3/4 Sacral / Perineal Wound  Required Activity Restriction: Trauma  Required Activity Restriction: Spine Surgery  If Patient is Being Followed by Urology Contact Them PRIOR to Removal  Do Not Remove Indwelling Urinary Catheter Order is Present with a CLINICAL REASON to Maintain the Catheter. Provider is Required to Include a Clinical Reason to Maintain a Urinary Catheter    Patient Admitted With Indwelling Urinary Catheter (Not Placed at [facility])  Assess for Continued Need & Document Medical Necessity  If Infection is Suspected, Contact the Provider       Placed in \"And\" Linked Group    06/26/24 0856    06/26/24 0857  NPO Diet NPO Type: Ice Chips  Diet Effective Now,   Status:  Canceled         06/26/24 0856    06/26/24 0857  CBC (No Diff)  Once         06/26/24 0856    06/26/24 0857  T Pallidum Antibody w/ reflex RPR (Syphilis)  Once         06/26/24 0856    06/26/24 0857  Type & Screen  Once         06/26/24 0856    06/26/24 0857  Insert Peripheral IV  Once         06/26/24 0856    06/26/24 0857  Saline Lock & Maintain IV Access  Continuous         06/26/24 0856    06/26/24 0856  sodium chloride 0.9 % flush 10 mL  As Needed,   Status:  Discontinued         06/26/24 0856    06/26/24 0856  sodium chloride 0.9 % infusion 40 mL  As Needed,   Status:  Discontinued         06/26/24 0856    06/26/24 0856  lidocaine PF 1% (XYLOCAINE) injection 0.5 mL  Once As Needed         06/26/24 0856    06/26/24 0856  acetaminophen (TYLENOL) tablet 650 mg  Every 4 Hours PRN,   Status:  Discontinued         06/26/24 0856    06/26/24 0856  morphine injection 4 mg  Every 2 Hours PRN,   Status:  Discontinued         06/26/24 0856    06/26/24 0856  ondansetron ODT (ZOFRAN-ODT) disintegrating tablet 4 mg  Every 6 Hours PRN        Placed in \"Or\" Linked Group    06/26/24 0856    " "06/26/24 0856  ondansetron (ZOFRAN) injection 4 mg  Every 6 Hours PRN        Placed in \"Or\" Linked Group    06/26/24 0856    06/26/24 0856  promethazine (PHENERGAN) suppository 12.5 mg  Every 6 Hours PRN        Placed in \"Or\" Linked Group    06/26/24 0856    06/26/24 0856  promethazine (PHENERGAN) tablet 12.5 mg  Every 6 Hours PRN        Placed in \"Or\" Linked Group    06/26/24 0856    06/26/24 0856  terbutaline (BRETHINE) injection 0.25 mg  As Needed,   Status:  Discontinued         06/26/24 0856    06/26/24 0856  Urinary Catheter Care  Every Shift      Placed in \"And\" Linked Group    06/26/24 0856    Unscheduled  Position change  As Needed      Comments: For intra-uterine resuscitation for hypertonus, hypertstimulation, or non-reassuring fetal status    06/26/24 0856    Unscheduled  Insert Indwelling Urinary Catheter  As Needed        Comments: EPIDURAL PLACEMENT   Placed in \"And\" Linked Group    06/26/24 0856    Unscheduled  IV Site Care  As Needed       06/26/24 1500    Unscheduled  Blood Gas, Arterial -With Co-Ox Panel: Yes  As Needed       06/26/24 1500    Pending  Inpatient Spiritual Care Consult  Once        Provider:  (Not yet assigned)    Pending    Signed and Held  Code Status and Medical Interventions:  Continuous         Signed and Held    Signed and Held  Vital Signs Per hospital policy  Per Hospital Policy         Signed and Held    Signed and Held  Notify Physician  Until Discontinued         Signed and Held    Signed and Held  Ambulate Patient  2 Times Daily      Comments: After anesthesia wears off.    Signed and Held    Signed and Held  Patient May Shower  Once        Comments: After anesthesia wears off and with assistance    Signed and Held    Signed and Held  Diet: Regular/House; Fluid Consistency: Thin (IDDSI 0)  Diet Effective Now         Signed and Held    Signed and Held  Advance Diet As Tolerated -Regular  Until Discontinued         Signed and Held    Signed and Held  I/O  Every Shift       " "Signed and Held    Signed and Held  Fundal and Lochia Check  Per Hospital Policy        Comments: Q 30 min x 2, Q 1 hr x 4, Q 4 hrs x 24 hrs, then Q shift.    Signed and Held    Signed and Held  Continue Indwelling Urinary Catheter Already in Place  Once         Signed and Held    Signed and Held  Discontinue Indwelling Urinary Catheter in AM  Once         Signed and Held    Signed and Held  Bladder Scan if Patient Unable to Void 4-6 Hours After Catheter Removal  As Needed         Signed and Held    Signed and Held  Straight Cath Every 4-6 Hours As Needed If Patient is Unable to Void After 4-6 Hours, Bladder Scan Volume is Greater Than 500mL & Patient Has Symptoms of Bladder Discomfort / Distention  As Needed         Signed and Held    Signed and Held  Notify Provider if Bladder Distention Continues  Until Discontinued         Signed and Held    Signed and Held  Schedule / Prompt Voiding For Patients With Urinary Incontinence  As Needed         Signed and Held    Signed and Held  Urinary Catheter Care  Every Shift       Signed and Held    Signed and Held  KPad  As Needed        Comments: PRN pain    Signed and Held    Signed and Held  Turn Cough Deep Breathe  Once         Signed and Held    Signed and Held  Incentive spirometry RT  Every Hour       Signed and Held    Signed and Held  Encourage early intake of PO fluids  Continuous         Signed and Held    Signed and Held  Ambulate Patient 3-5 times per day (with or without [place])  Every Shift       Signed and Held    Signed and Held  Chewing Gum  As Needed         Signed and Held    Signed and Held  Apply Abdominal Binding Until Discontinued  Until Discontinued         Signed and Held    Signed and Held  \"If patient tolerates food and liquids after completion of second bag of Pitocin, saline lock IV and discontinue IV fluid infusions.  Once         Signed and Held    Signed and Held  Discontinue IV  Once         Signed and Held    Signed and Held  Warm compress  " "As Needed         Signed and Held    Signed and Held  Breast pump to bed  Once         Signed and Held    Signed and Held  Apply ace wrap, tight bra, or binder  As Needed         Signed and Held    Signed and Held  Apply ice packs  As Needed         Signed and Held    Signed and Held  If indicated -- Please administer RH Immunoglobulin based on results of cord blood evaluation and fetal screen lab tests, pharmacy to dispense  Per Order Details        Comments: See Process Instructions For Reference Range Details.    Signed and Held    Signed and Held  CBC & Differential  Morning Draw         Signed and Held    Signed and Held  Hemoglobin & Hematocrit, Blood  Once         Signed and Held    Signed and Held  Insert Peripheral IV  Once         Signed and Held    Signed and Held  Saline Lock & Maintain IV Access  Continuous         Signed and Held    Signed and Held  sodium chloride 0.9 % flush 3 mL  Every 12 Hours Scheduled         Signed and Held    Signed and Held  sodium chloride 0.9 % flush 3-10 mL  As Needed         Signed and Held    Signed and Held  sodium chloride 0.9 % infusion 40 mL  As Needed         Signed and Held    Signed and Held  oxytocin (PITOCIN) 30 units in 0.9% sodium chloride 500 mL (premix)  Once As Needed         Signed and Held    Signed and Held  acetaminophen (TYLENOL) tablet 1,000 mg  Every 6 Hours        Placed in \"Followed by\" Linked Group    Signed and Held    Signed and Held  acetaminophen (TYLENOL) tablet 650 mg  Every 6 Hours        Placed in \"Followed by\" Linked Group    Signed and Held    Signed and Held  ketorolac (TORADOL) injection 15 mg  Every 6 Hours        Placed in \"Followed by\" Linked Group    Signed and Held    Signed and Held  ibuprofen (ADVIL,MOTRIN) tablet 600 mg  Every 6 Hours        Placed in \"Followed by\" Linked Group    Signed and Held    Signed and Held  oxyCODONE (ROXICODONE) immediate release tablet 5 mg  Every 4 Hours PRN        Placed in \"Or\" Linked Group    " Anesthesia Volume In Cc (Will Not Render If 0): 0.7 "Signed and Held    Signed and Held  oxyCODONE (ROXICODONE) immediate release tablet 10 mg  Every 4 Hours PRN        Placed in \"Or\" Linked Group    Signed and Held    Signed and Held  docusate sodium (COLACE) capsule 100 mg  2 Times Daily PRN         Signed and Held    Signed and Held  simethicone (MYLICON) chewable tablet 80 mg  4 Times Daily PRN         Signed and Held    Signed and Held  ondansetron ODT (ZOFRAN-ODT) disintegrating tablet 4 mg  Every 6 Hours PRN        Placed in \"Or\" Linked Group    Signed and Held    Signed and Held  ondansetron (ZOFRAN) injection 4 mg  Every 6 Hours PRN        Placed in \"Or\" Linked Group    Signed and Held    Signed and Held  promethazine (PHENERGAN) tablet 12.5 mg  Every 4 Hours PRN         Signed and Held    Signed and Held  promethazine (PHENERGAN) tablet 25 mg  Every 6 Hours PRN        Placed in \"Or\" Linked Group    Signed and Held    Signed and Held  promethazine (PHENERGAN) suppository 12.5 mg  Every 6 Hours PRN        Placed in \"Or\" Linked Group    Signed and Held    Signed and Held  lanolin topical 1 Application  Every 1 Hour PRN         Signed and Held    Signed and Held  carboprost (HEMABATE) injection 250 mcg  As Needed         Signed and Held    Signed and Held  miSOPROStol (CYTOTEC) tablet 800 mcg  As Needed         Signed and Held    Signed and Held  methylergonovine (METHERGINE) injection 200 mcg  As Needed         Signed and Held    Signed and Held  hydrOXYzine (ATARAX) tablet 50 mg  Every 6 Hours PRN         Signed and Held    Signed and Held  Hydrocortisone (Perianal) (ANUSOL-HC) 2.5 % rectal cream 1 Application  As Needed         Signed and Held    Signed and Held  prenatal vitamin tablet 1 tablet  Daily         Signed and Held    Signed and Held  aluminum-magnesium hydroxide-simethicone (MAALOX MAX) 400-400-40 MG/5ML suspension 15 mL  Every 4 Hours PRN        Placed in \"Or\" Linked Group    Signed and Held    Signed and Held  calcium carbonate (TUMS) chewable " "tablet 500 mg (200 mg elemental)  Every 4 Hours PRN        Placed in \"Or\" Linked Group    Signed and Held    Signed and Held  Place Sequential Compression Device  Once         Signed and Held    Signed and Held  Maintain Sequential Compression Device  Continuous         Signed and Held    Signed and Held  Enoxaparin Sodium (LOVENOX) syringe 40 mg  Every 12 Hours         Signed and Held    Signed and Held  POC Glucose Finger 4x Daily Fasting & PC  4 Times Daily Fasting & After Meals      Comments: Complete no more than 45 minutes prior to patient eating      Signed and Held    Signed and Held  ondansetron (ZOFRAN) injection 4 mg  Once As Needed         Signed and Held    Signed and Held  naloxone (NARCAN) injection 0.4 mg  Every 1 Hour PRN         Signed and Held                     Operative/Procedure Notes (last 24 hours)        Virginia Mensah MD at 24 1156          [facility]   Section Operative Note    Pre-Operative Dx:   1, DC/DA Twin Pregnancy  2. A2DM on insulin  3. Desired sterilization         Postoperative dx:    1.  Same     Procedure: Procedure(s):   SECTION PRIMARY WITH BILATERAL SALPINGECTOMY     Surgeon/Assistant: Surgeon(s):  Virginia Mensah MD         Anesthesia:  Anesthesiologist: Spinal  Anesthesiologist: Rayna Gayle DO  CRNA: Zuleima Del Castillo CRNA         QBL:  Not calculated at time of note.         IV Fluids: 800 mls.   UOP: 250 mls.    I/O this shift:  In: 800 [I.V.:800]  Out: 250 [Urine:250]   Antibiotics: cefazolin (Ancef)     Infant:           Gender:    Ravindra Jett [[id number]]   female      Les Jett [[id number]]   male  infant    Weight:    Ravindra Jett [[id number]]   No birth weight on file.      Les Jett [[id number]]   No birth weight on file.     Apgars:    Ravindra Jett [[id number]]   8      Les Jett [[id number]]   8  @ 1 minute /        Ravindra Jett " "[[id number]]   9      Les Jett [[id number]]   9  @ 5 minutes    Cord gases: Venous:  No results found for: \"PHCVEN\", \"BECVEN\"     Arterial:  No results found for: \"PHCART\", \"BECART\"     Indication for C/Section:    Twin Pregnancy     Priority for C/Section:    routine     Procedure Details:   The patient was taken to the operating room after risks and benefits have been reviewed. The consent was reviewed and had been signed. Time Out was performed. She was prepped and draped in the normal sterile fashion in the dorsal supine position and a leftward tilt.  A Pfannenstiel skin incision made 2 cm above the pubic symphysis and carried down to the underlying layer of fascia with the scalpel.  The fascia was nicked in the midline and the incision was extended laterally with Amin scissors.  The anterior aspect of the incision was grasped with Kocher clamps x2 and elevated and the underlying rectus muscles were dissected off sharply and bluntly.  The inferior aspect of the incision was treated similarly.  The peritoneum was identified and entered bluntly the incision was extended with lateral traction.  A bladder blade was inserted.  The lower uterine segment was identified and incised in a transverse fashion with the scalpel.  The uterine cavity was entered bluntly and the incision was extended with cephalocaudad traction.  Fetus A was then delivered atraumatically.  Cord was clamped and cut after 60 second delay and the infant was handed off to DRT staff for evaluation. Twin B then delivered from vertex position. Cord blood was obtained.  The placentas were delivered via uterine massage.  The uterus was then exteriorized and cleared of all clot and debris with clean laparotomy sponges.  The hysterotomy was closed in a single layer with a #1 chromic in a running locking fashion.    The posterior cul-de-sac was irrigated and aspirated.  The hysterotomy was again inspected and noted to be hemostatic.  The left " fallopian tubes was identified and followed out to the fimbria. Tube was removed in its entirety with EnSeal device . Hemostasis noted at the seal line. The same procedure was performed on the right. Tubes handed off for pathologic review.The uterus was returned to the abdomen.  The paracolic gutters were irrigated and aspirated.  Hysterotomy was inspected for third time and noted to be hemostatic.  The peritoneum was closed with 2-0 chromic in a running fashion.  The rectus muscles underlying the fascia were made hemostatic with Bovie electrocautery.  The fascia was then closed with 1 Vicryl in a running stitch.  The subcutaneous tissues were irrigated aspirated and made hemostatic with Bovie electrocautery.  The subcutaneous tissues were reapproximated with 3-0 Vicryl and the skin was closed with 4-0 Monocryl in a subcuticular fashion and sealed with Dermabond.  All counts were correct and the patient was taken to the recovery room in stable condition.          Complications:   None     Specimens: bilateral fallopian tubes     Disposition:   Mother to Mother Baby/Postpartum  in stable condition currently.   Babies to NBN  in stable condition currently.       Virginia Mensah MD  6/26/2024  13:44 EDT        Electronically signed by Virginia Mensah MD at 06/26/24 1348       Physician Progress Notes (last 24 hours)  Notes from 06/25/24 1508 through 06/26/24 1508   No notes of this type exist for this encounter.

## 2024-06-26 NOTE — OP NOTE
"[facility]   Section Operative Note    Pre-Operative Dx:   1, DC/DA Twin Pregnancy  2. A2DM on insulin  3. Desired sterilization         Postoperative dx:    1.  Same     Procedure: Procedure(s):   SECTION PRIMARY WITH BILATERAL SALPINGECTOMY     Surgeon/Assistant: Surgeon(s):  Virginia Mensah MD         Anesthesia:  Anesthesiologist: Spinal  Anesthesiologist: Rayna Gayle DO  CRNA: Zuleima Del Castillo CRNA         QBL:  Not calculated at time of note.         IV Fluids: 800 mls.   UOP: 250 mls.    I/O this shift:  In: 800 [I.V.:800]  Out: 250 [Urine:250]   Antibiotics: cefazolin (Ancef)     Infant:           Gender:    Pasquale JettGal CRAWFORD [[id number]]   female      Les Jett [[id number]]   male  infant    Weight:    Ravindra Jett [[id number]]   No birth weight on file.      Les Jett [[id number]]   No birth weight on file.     Apgars:    Ravindra Jett [[id number]]   8      Les Jett [[id number]]   8  @ 1 minute /        Ravindra Jett [[id number]]   9      Les Jett [[id number]]   9  @ 5 minutes    Cord gases: Venous:  No results found for: \"PHCVEN\", \"BECVEN\"     Arterial:  No results found for: \"PHCART\", \"BECART\"     Indication for C/Section:    Twin Pregnancy     Priority for C/Section:    routine     Procedure Details:   The patient was taken to the operating room after risks and benefits have been reviewed. The consent was reviewed and had been signed. Time Out was performed. She was prepped and draped in the normal sterile fashion in the dorsal supine position and a leftward tilt.  A Pfannenstiel skin incision made 2 cm above the pubic symphysis and carried down to the underlying layer of fascia with the scalpel.  The fascia was nicked in the midline and the incision was extended laterally with Amin scissors.  The anterior aspect of the incision was grasped with Kocher clamps x2 and " elevated and the underlying rectus muscles were dissected off sharply and bluntly.  The inferior aspect of the incision was treated similarly.  The peritoneum was identified and entered bluntly the incision was extended with lateral traction.  A bladder blade was inserted.  The lower uterine segment was identified and incised in a transverse fashion with the scalpel.  The uterine cavity was entered bluntly and the incision was extended with cephalocaudad traction.  Fetus A was then delivered atraumatically.  Cord was clamped and cut after 60 second delay and the infant was handed off to DRT staff for evaluation. Twin B then delivered from vertex position. Cord blood was obtained.  The placentas were delivered via uterine massage.  The uterus was then exteriorized and cleared of all clot and debris with clean laparotomy sponges.  The hysterotomy was closed in a single layer with a #1 chromic in a running locking fashion.    The posterior cul-de-sac was irrigated and aspirated.  The hysterotomy was again inspected and noted to be hemostatic.  The left fallopian tubes was identified and followed out to the fimbria. Tube was removed in its entirety with EnSeal device . Hemostasis noted at the seal line. The same procedure was performed on the right. Tubes handed off for pathologic review.The uterus was returned to the abdomen.  The paracolic gutters were irrigated and aspirated.  Hysterotomy was inspected for third time and noted to be hemostatic.  The peritoneum was closed with 2-0 chromic in a running fashion.  The rectus muscles underlying the fascia were made hemostatic with Bovie electrocautery.  The fascia was then closed with 1 Vicryl in a running stitch.  The subcutaneous tissues were irrigated aspirated and made hemostatic with Bovie electrocautery.  The subcutaneous tissues were reapproximated with 3-0 Vicryl and the skin was closed with 4-0 Monocryl in a subcuticular fashion and sealed with Dermabond.  All  counts were correct and the patient was taken to the recovery room in stable condition.          Complications:   None     Specimens: bilateral fallopian tubes     Disposition:   Mother to Mother Baby/Postpartum  in stable condition currently.   Babies to NBN  in stable condition currently.       Virginia Mensah MD  6/26/2024  13:44 EDT

## 2024-06-26 NOTE — LACTATION NOTE
06/26/24 1730   Maternal Information   Date of Referral 06/26/24   Person Making Referral lactation consultant  (courtesy)   Maternal Reason for Referral multiple birth  (Mom plans to breast and formula feed.  Has done this with previous children until her milk dried up and switched over to formula. Infants just recieved bottles so unable to help at breast.  Shield placed at bedside in case mom needs for future feeding.)   Infant Reason for Referral 35-37 weeks gestation;low birth weight  (symphony pump set up at bedside and parents educated on use.  Able to teach back pump use.  Encouraged q 3 hour pumping.  Mom does not wish to start pumping yet.  Encouraged her to begin as soon as possible.  Discussed importance of)   Maternal Assessment   Breast Size Issue none  (early initiation to success with production.  Mom does not have a breast pump at home but desires a hands free pump.  Discussed the importance of getting a pump soon.  May need a manual prior to discharge.  Lactation will continue to follow tomorrow.)   Breast Shape Bilateral:;round   Breast Density Bilateral:;soft   Nipples Bilateral:;everted;short   Left Nipple Symptoms nontender;intact   Right Nipple Symptoms intact;nontender   Maternal Infant Feeding   Maternal Emotional State receptive   Milk Expression/Equipment   Breast Pump Type double electric, hospital grade   Breast Pump Flange Size 21 mm   Equipment for Home Use (S)  needs breast pump   Breast Pumping   Breast Pumping Interventions frequent pumping encouraged;post-feed pumping encouraged  (q 3  hours/post nursing)

## 2024-06-26 NOTE — ANESTHESIA POSTPROCEDURE EVALUATION
Patient: Neema Jett    Procedure Summary       Date: 24 Room / Location: [facility] LABOR DELIVERY   [facility] LABOR DELIVERY    Anesthesia Start: 1301 Anesthesia Stop: 1352    Procedure:  SECTION PRIMARY WITH TUBAL (Bilateral: Abdomen) Diagnosis:     Surgeons: Virginia Mensah MD Provider: Rayna Gayle DO    Anesthesia Type: ITN, spinal ASA Status: 3            Anesthesia Type: ITN, spinal    Vitals  Vitals Value Taken Time   BP 86/42    Temp 97.5    Pulse 59 24 1351   Resp 16    SpO2 98 % 24 1351   Vitals shown include unfiled device data.        Post Anesthesia Care and Evaluation    Patient location during evaluation: bedside  Patient participation: complete - patient participated  Level of consciousness: awake and alert  Pain management: adequate    Airway patency: patent  Anesthetic complications: No anesthetic complications    Cardiovascular status: acceptable  Respiratory status: acceptable  Hydration status: acceptable

## 2024-06-26 NOTE — H&P
RANDI Villalobos  Obstetric History and Physical    CC: Twin pregnancy    SUBJECTIVE:     Patient is a 28 y.o. female  currently at 37w1d, who presents with pregnancy c/b DC/DA Twin pregnancy with A2DM managed with insulin. She was admitted for IOL this am but has now decided she would prefer to proceed with PCD. She has been NPO since last night.       Prenatal Information:  Prenatal Results       Initial Prenatal Labs       Test Value Reference Range Date Time    Hemoglobin  9.7 g/dL 12.0 - 15.9 24 1637    Hematocrit  29.6 % 34.0 - 46.6 24 1637    Platelets  340 10*3/mm3 140 - 450 24 1637    Rubella IgG  1.70 index Immune >0.99 24 1637    Hepatitis B SAg  Non-Reactive  Non-Reactive 24 1637    Hepatitis C Ab  Non-Reactive  Non-Reactive 24 1637    RPR        T. Pallidum Ab         ABO  O   24 1637    Rh  Positive   24 1637    Antibody Screen  Negative   24 1637    HIV  Non-Reactive  Non-Reactive 24 1637    Urine Culture  25,000 CFU/mL Normal Urogenital Sendy   24 1637    Gonorrhea  Negative  Negative 24 1637    Chlamydia  Negative  Negative 24 1637    TSH        HgB A1c   5.5 % 4.5 - 5.7 24 0832       5.70 % 4.80 - 5.60 24 1637    Varicella IgG        Hemoglobinopathy Fractionation        Hemoglobinopathy (genetic testing)        Cystic fibrosis                   Fetal testing        Test Value Reference Range Date Time    NIPT        MSAFP        AFP-4                  2nd and 3rd Trimester       Test Value Reference Range Date Time    Hemoglobin (repeated)  10.8 g/dL 12.0 - 15.9 24 0959    Hematocrit (repeated)  31.6 % 34.0 - 46.6 24 0959    Platelets   183 10*3/mm3 140 - 450 24 0959       340 10*3/mm3 140 - 450 24 1637    1 hour GTT   214 mg/dL 74 - 180 24 1022       152 mg/dL 65 - 139 24 1043    Antibody Screen (repeated)        3rd TM syphilis scrn (repeated)  RPR         3rd TM  syphilis scrn (repeated) FTA        GTT Fasting        GTT 1 Hr        GTT 2 Hr        GTT 3 Hr  100 mg/dL 74 - 140 04/05/24 1223    Group B Strep                  Other testing        Test Value Reference Range Date Time    Parvo IgG         CMV IgG                   Drug Screening       Test Value Reference Range Date Time    Amphetamine Screen  Negative  Negative 02/07/24 1637    Barbiturate Screen  Negative  Negative 02/07/24 1637    Benzodiazepine Screen  Negative  Negative 02/07/24 1637    Methadone Screen  Negative  Negative 02/07/24 1637    Phencyclidine Screen  Negative  Negative 02/07/24 1637    Opiates Screen  Negative  Negative 02/07/24 1637    THC Screen  Negative  Negative 02/07/24 1637    Cocaine Screen  Negative  Negative 02/07/24 1637    Propoxyphene Screen        Buprenorphine Screen  Negative  Negative 02/07/24 1637    Methamphetamine Screen  Negative  Negative 02/07/24 1637    Oxycodone Screen  Negative  Negative 02/07/24 1637    Tricyclic Antidepressants Screen  Negative  Negative 02/07/24 1637              Legend    ^: Historical                          External Prenatal Results       Pregnancy Outside Results - Transcribed From Office Records - See Scanned Records For Details       Test Value Date Time    ABO  O  02/07/24 1637    Rh  Positive  02/07/24 1637    Antibody Screen  Negative  02/07/24 1637    Varicella IgG       Rubella  1.70 index 02/07/24 1637    Hgb  10.8 g/dL 06/26/24 0959       9.7 g/dL 02/07/24 1637    Hct  31.6 % 06/26/24 0959       29.6 % 02/07/24 1637    HgB A1c   5.5 % 05/09/24 0832       5.70 % 02/07/24 1637    1h GTT  214 mg/dL 04/05/24 1022       152 mg/dL 03/26/24 1043    3h GTT Fasting       3h GTT 1 hour       3h GTT 2 hour       3h GTT 3 hour   100 mg/dL 04/05/24 1223    Gonorrhea (discrete)  Negative  02/07/24 1637    Chlamydia (discrete)  Negative  02/07/24 1637    RPR       Syphilis Antibody       HBsAg  Non-Reactive  02/07/24 1637    Herpes Simplex Virus PCR        Herpes Simplex VIrus Culture       HIV  Non-Reactive  24 1637    Hep C RNA Quant PCR       Hep C Antibody  Non-Reactive  24 1637    AFP       NIPT       Cystic Fibroisis        Group B Strep       GBS Susceptibility to Clindamycin       GBS Susceptibility to Erythromycin       Fetal Fibronectin       Genetic Testing, Maternal Blood                 Drug Screening       Test Value Date Time    Urine Drug Screen       Amphetamine Screen  Negative  24 1637    Barbiturate Screen  Negative  24 1637    Benzodiazepine Screen  Negative  24 1637    Methadone Screen  Negative  24 1637    Phencyclidine Screen  Negative  24 1637    Opiates Screen  Negative  24 1637    THC Screen  Negative  24 1637    Cocaine Screen       Propoxyphene Screen       Buprenorphine Screen  Negative  24 1637    Methamphetamine Screen       Oxycodone Screen  Negative  24 1637    Tricyclic Antidepressants Screen  Negative  24 1637              Legend    ^: Historical                             OB History:                     OB History    Para Term  AB Living   4 2 2 0 1 2   SAB IAB Ectopic Molar Multiple Live Births   1 0 0 0 0 2      # Outcome Date GA Lbr [place]/2nd Weight Sex Type Anes PTL Lv   4 Current            3 SAB 2014     SAB      2 Term 13   3289 g (7 lb 4 oz) M Vag-Spont EPI N BRIGIDA   1 Term 11   3374 g (7 lb 7 oz) F Vag-Spont EPI N BRIGIDA      Name: Luz      Allergies:                      No Known Allergies   Past Medical History: Past Medical History:   Diagnosis Date    Anxiety     Cholelithiasis 2016    Depression     Dislocation of right knee 2014    Pap smear for cervical cancer screening 2016      Past Surgical History Past Surgical History:   Procedure Laterality Date    CHOLECYSTECTOMY  2016    KNEE SURGERY Right 2014    PATELLAR TENDON GRAFT IMPLANTATION  2014    VAGINAL DELIVERY      x2    WISDOM TOOTH EXTRACTION         Family History: Family History   Problem Relation Age of Onset    Hypertension Mother     Diabetes Father     Diabetes Maternal Grandmother     Hypertension Maternal Grandmother     Diabetes Paternal Grandmother       Social History:  reports that she has never smoked. She has never been exposed to tobacco smoke. She has never used smokeless tobacco.   reports current alcohol use of about 2.0 standard drinks of alcohol per week.   reports no history of drug use.    General ROS: Pertinent items are noted in HPI, all other systems reviewed and negative   Medications: Insulin Glargine, Insulin Lispro (1 Unit Dial), Ketone Test, Lancets, Pen Needles, cetirizine, docusate sodium, ferrous sulfate, fluticasone, glucose blood, polyethylene glycol, and prenatal vitamin 28-0.8       Objective       Vital Signs Range for the last 24 hours  Temperature: Temp:  [98.1 °F (36.7 °C)] 98.1 °F (36.7 °C)   BP: BP: (155)/(92) 155/92   Pulse: Heart Rate:  [67] 67   Respirations: Resp:  [16] 16   SPO2:                 Physical Examination: General appearance - alert, well appearing, and in no distress  Chest - clear to auscultation, no wheezes, rales or rhonchi, symmetric air entry  Heart - normal rate, regular rhythm, normal S1, S2, no murmurs, rubs, clicks or gallops  Abdomen - Soft, gravid, nontender  Extremities - pedal edema tr +      Presentation: VTX/Transverse on US   Cervix: Exam by: Method: sterile exam per CN   Dilation: Cervical Dilation (cm): 1   Effacement:     Station:         Fetal Heart Rate Assessment           Baseline:  normal   Variability:  moderate   Accels:  present   Decels:  absent   Tracing Category:  1 x2     Uterine Assessment   Method: Method: external tocotransducer, palpation   Frequency (min):     Ctx Count in 10 min:       Laboratory Results:  WBC   Date Value Ref Range Status   06/26/2024 7.28 3.40 - 10.80 10*3/mm3 Final     RBC   Date Value Ref Range Status   06/26/2024 3.61 (L) 3.77 - 5.28 10*6/mm3  Final     Hemoglobin   Date Value Ref Range Status   2024 10.8 (L) 12.0 - 15.9 g/dL Final     Hematocrit   Date Value Ref Range Status   2024 31.6 (L) 34.0 - 46.6 % Final     MCV   Date Value Ref Range Status   2024 87.5 79.0 - 97.0 fL Final     MCH   Date Value Ref Range Status   2024 29.9 26.6 - 33.0 pg Final     MCHC   Date Value Ref Range Status   2024 34.2 31.5 - 35.7 g/dL Final     RDW   Date Value Ref Range Status   2024 15.2 12.3 - 15.4 % Final     RDW-SD   Date Value Ref Range Status   2024 47.8 37.0 - 54.0 fl Final     MPV   Date Value Ref Range Status   2024 10.5 6.0 - 12.0 fL Final     Platelets   Date Value Ref Range Status   2024 183 140 - 450 10*3/mm3 Final             Assessment/Plan:   IUP 37w1d with DC/DA Twins and A2DM    1.Admit and proceed with delivery  2.long discussion had over several hours about mode of delivery. Discussion included r/b of CD and  with possible breech extraction. She has elected to proceed with PCD  3.FWB reassuring x2  4. A2DM: FSBS q4 hr for now  5. Desires sterilization. Consents had been signed in office. Will proceed with this during CD  6. Tylenol, ancef, ken Mensah MD  2024  10:59 EDT

## 2024-06-26 NOTE — ANESTHESIA PROCEDURE NOTES
Spinal Block      Patient reassessed immediately prior to procedure    Patient location during procedure: OR  Indication:at surgeon's request  Performed By  CRNA/JEAN PIERRE: Zuleima Del Castillo CRNA  Preanesthetic Checklist  Completed: patient identified, IV checked, site marked, risks and benefits discussed, surgical consent, monitors and equipment checked, pre-op evaluation and timeout performed  Spinal Block Prep:  Patient Position:sitting  Sterile Tech:cap, gloves, mask and sterile barriers  Prep:Betadine  Patient Monitoring:blood pressure monitoring, continuous pulse oximetry and EKG    Spinal Block Procedure  Approach:midline  Guidance:palpation technique  Location:L3-L4  Needle Type:Delgado  Needle Gauge:25 G  Placement of Spinal needle event:cerebrospinal fluid aspirated  Paresthesia: no  Fluid Appearance:clear     Post Assessment  Patient Tolerance:patient tolerated the procedure well with no apparent complications  Complications no

## 2024-06-27 PROBLEM — Z34.90 TERM PREGNANCY: Status: RESOLVED | Noted: 2024-06-26 | Resolved: 2024-06-27

## 2024-06-27 PROBLEM — Z98.891 STATUS POST PRIMARY LOW TRANSVERSE CESAREAN SECTION: Status: ACTIVE | Noted: 2024-06-27

## 2024-06-27 LAB
BASOPHILS # BLD AUTO: 0.04 10*3/MM3 (ref 0–0.2)
BASOPHILS NFR BLD AUTO: 0.5 % (ref 0–1.5)
DEPRECATED RDW RBC AUTO: 47.8 FL (ref 37–54)
EOSINOPHIL # BLD AUTO: 0.1 10*3/MM3 (ref 0–0.4)
EOSINOPHIL NFR BLD AUTO: 1.2 % (ref 0.3–6.2)
ERYTHROCYTE [DISTWIDTH] IN BLOOD BY AUTOMATED COUNT: 15.2 % (ref 12.3–15.4)
GLUCOSE BLDC GLUCOMTR-MCNC: 119 MG/DL (ref 70–130)
GLUCOSE BLDC GLUCOMTR-MCNC: 69 MG/DL (ref 70–130)
HCT VFR BLD AUTO: 22.7 % (ref 34–46.6)
HGB BLD-MCNC: 7.7 G/DL (ref 12–15.9)
IMM GRANULOCYTES # BLD AUTO: 0.05 10*3/MM3 (ref 0–0.05)
IMM GRANULOCYTES NFR BLD AUTO: 0.6 % (ref 0–0.5)
LYMPHOCYTES # BLD AUTO: 1.71 10*3/MM3 (ref 0.7–3.1)
LYMPHOCYTES NFR BLD AUTO: 19.9 % (ref 19.6–45.3)
MCH RBC QN AUTO: 29.8 PG (ref 26.6–33)
MCHC RBC AUTO-ENTMCNC: 33.9 G/DL (ref 31.5–35.7)
MCV RBC AUTO: 88 FL (ref 79–97)
MONOCYTES # BLD AUTO: 0.45 10*3/MM3 (ref 0.1–0.9)
MONOCYTES NFR BLD AUTO: 5.2 % (ref 5–12)
NEUTROPHILS NFR BLD AUTO: 6.25 10*3/MM3 (ref 1.7–7)
NEUTROPHILS NFR BLD AUTO: 72.6 % (ref 42.7–76)
NRBC BLD AUTO-RTO: 0 /100 WBC (ref 0–0.2)
PLATELET # BLD AUTO: 153 10*3/MM3 (ref 140–450)
PMV BLD AUTO: 10.8 FL (ref 6–12)
RBC # BLD AUTO: 2.58 10*6/MM3 (ref 3.77–5.28)
WBC NRBC COR # BLD AUTO: 8.6 10*3/MM3 (ref 3.4–10.8)

## 2024-06-27 PROCEDURE — 85025 COMPLETE CBC W/AUTO DIFF WBC: CPT | Performed by: OBSTETRICS & GYNECOLOGY

## 2024-06-27 PROCEDURE — 82948 REAGENT STRIP/BLOOD GLUCOSE: CPT

## 2024-06-27 PROCEDURE — 25010000002 ENOXAPARIN PER 10 MG: Performed by: OBSTETRICS & GYNECOLOGY

## 2024-06-27 PROCEDURE — 25010000002 KETOROLAC TROMETHAMINE PER 15 MG: Performed by: OBSTETRICS & GYNECOLOGY

## 2024-06-27 RX ORDER — CETIRIZINE HYDROCHLORIDE 10 MG/1
10 TABLET ORAL DAILY
Status: DISCONTINUED | OUTPATIENT
Start: 2024-06-27 | End: 2024-06-29 | Stop reason: HOSPADM

## 2024-06-27 RX ORDER — DOCUSATE SODIUM 100 MG/1
100 CAPSULE, LIQUID FILLED ORAL 2 TIMES DAILY
Status: DISCONTINUED | OUTPATIENT
Start: 2024-06-27 | End: 2024-06-29 | Stop reason: HOSPADM

## 2024-06-27 RX ORDER — FERROUS SULFATE 325(65) MG
325 TABLET ORAL
Status: DISCONTINUED | OUTPATIENT
Start: 2024-06-27 | End: 2024-06-29 | Stop reason: HOSPADM

## 2024-06-27 RX ADMIN — ACETAMINOPHEN 1000 MG: 500 TABLET ORAL at 00:18

## 2024-06-27 RX ADMIN — CETIRIZINE HYDROCHLORIDE 10 MG: 10 TABLET, FILM COATED ORAL at 22:04

## 2024-06-27 RX ADMIN — CETIRIZINE HYDROCHLORIDE 10 MG: 10 TABLET, FILM COATED ORAL at 00:14

## 2024-06-27 RX ADMIN — OXYCODONE HYDROCHLORIDE 10 MG: 10 TABLET ORAL at 13:53

## 2024-06-27 RX ADMIN — ENOXAPARIN SODIUM 40 MG: 100 INJECTION SUBCUTANEOUS at 16:04

## 2024-06-27 RX ADMIN — FERROUS SULFATE TAB 325 MG (65 MG ELEMENTAL FE) 325 MG: 325 (65 FE) TAB at 09:06

## 2024-06-27 RX ADMIN — OXYCODONE HYDROCHLORIDE 10 MG: 10 TABLET ORAL at 03:58

## 2024-06-27 RX ADMIN — OXYCODONE HYDROCHLORIDE 10 MG: 10 TABLET ORAL at 09:06

## 2024-06-27 RX ADMIN — DOCUSATE SODIUM 100 MG: 100 CAPSULE, LIQUID FILLED ORAL at 09:07

## 2024-06-27 RX ADMIN — ACETAMINOPHEN 650 MG: 325 TABLET, FILM COATED ORAL at 18:30

## 2024-06-27 RX ADMIN — KETOROLAC TROMETHAMINE 15 MG: 15 INJECTION, SOLUTION INTRAMUSCULAR; INTRAVENOUS at 03:56

## 2024-06-27 RX ADMIN — OXYCODONE HYDROCHLORIDE 10 MG: 10 TABLET ORAL at 19:40

## 2024-06-27 RX ADMIN — OXYCODONE HYDROCHLORIDE 10 MG: 10 TABLET ORAL at 23:39

## 2024-06-27 RX ADMIN — ACETAMINOPHEN 1000 MG: 500 TABLET ORAL at 12:50

## 2024-06-27 RX ADMIN — ACETAMINOPHEN 1000 MG: 500 TABLET ORAL at 06:35

## 2024-06-27 RX ADMIN — SIMETHICONE 80 MG: 80 TABLET, CHEWABLE ORAL at 22:04

## 2024-06-27 RX ADMIN — IBUPROFEN 600 MG: 600 TABLET, FILM COATED ORAL at 22:03

## 2024-06-27 RX ADMIN — KETOROLAC TROMETHAMINE 15 MG: 15 INJECTION, SOLUTION INTRAMUSCULAR; INTRAVENOUS at 10:33

## 2024-06-27 RX ADMIN — DOCUSATE SODIUM 100 MG: 100 CAPSULE, LIQUID FILLED ORAL at 22:03

## 2024-06-27 RX ADMIN — KETOROLAC TROMETHAMINE 15 MG: 15 INJECTION, SOLUTION INTRAMUSCULAR; INTRAVENOUS at 16:04

## 2024-06-27 RX ADMIN — PRENATAL VITAMINS-IRON FUMARATE 27 MG IRON-FOLIC ACID 0.8 MG TABLET 1 TABLET: at 09:07

## 2024-06-27 NOTE — PAYOR COMM NOTE
"Neema Jett (28 y.o. Female)     [employer] Auth#[id number]    Delivery Information:  C Section 6/26/24  Girl A born @ 13:21  Wt 2551 gms  Apgars 8/9  Baby B born @ 13:22  Wt 2205 gms  Apgars 8/9    From:Inez Nguyen LPN, Utilization Review  Phone #[phone]  Fax #[phone]        Date of Birth   1995    Social Security Number       Address   [address]  [place] [address]    Home Phone   [phone]    MRN   [id number]       Worship   None    Marital Status                               Admission Date   6/26/24    Admission Type   Elective    Admitting Provider   Virginia Mensah MD    Attending Provider   Virginia Mensah MD    Department, Room/Bed   [facility] MOTHER BABY 4B, N430/1       Discharge Date       Discharge Disposition       Discharge Destination                                 Attending Provider: Virginia Mensah MD    Allergies: No Known Allergies    Isolation: None   Infection: None   Code Status: CPR    Ht: 149.9 cm (59\")   Wt: 95.3 kg (210 lb)    Admission Cmt: None   Principal Problem: Term pregnancy [Z34.90]                   Active Insurance as of 6/26/2024       Primary Coverage       Payor Plan Insurance Group Employer/Plan Group    [employer] [place] [employer]        Payor Plan Address Payor Plan Phone Number Payor Plan Fax Number Effective Dates    [address] [address] [phone]  4/21/2022 - None Entered    [facility] [id number]         Subscriber Name Subscriber Birth Date Member ID       NEEMA JETT 1995 [id number]                     Emergency Contacts        (Rel.) Home Phone Work Phone Mobile Phone    Elba Bergman (Mother) -- -- [phone]    NIRAJ JETT (Spouse) -- -- [phone]              Insurance Information                  [facility]/[employer] MEDICAID Phone: [phone]    Subscriber: Neema Jett Subscriber#: [id number]    Group#: -- Precert#: --             History & Physical    "     Virginia Mensah MD at 24 1052          [facility]  Obstetric History and Physical    CC: Twin pregnancy    SUBJECTIVE:     Patient is a 28 y.o. female  currently at 37w1d, who presents with pregnancy c/b DC/DA Twin pregnancy with A2DM managed with insulin. She was admitted for IOL this am but has now decided she would prefer to proceed with PCD. She has been NPO since last night.       Prenatal Information:  Prenatal Results       Initial Prenatal Labs       Test Value Reference Range Date Time    Hemoglobin  9.7 g/dL 12.0 - 15.9 24 1637    Hematocrit  29.6 % 34.0 - 46.6 24 1637    Platelets  340 10*3/mm3 140 - 450 24 1637    Rubella IgG  1.70 index Immune >0.99 24 1637    Hepatitis B SAg  Non-Reactive  Non-Reactive 24 1637    Hepatitis C Ab  Non-Reactive  Non-Reactive 24 1637    RPR        T. Pallidum Ab         ABO  O   24 1637    Rh  Positive   24 1637    Antibody Screen  Negative   24 1637    HIV  Non-Reactive  Non-Reactive 24 1637    Urine Culture  25,000 CFU/mL Normal Urogenital Sendy   24 1637    Gonorrhea  Negative  Negative 24 1637    Chlamydia  Negative  Negative 24 1637    TSH        HgB A1c   5.5 % 4.5 - 5.7 24 0832       5.70 % 4.80 - 5.60 24 1637    Varicella IgG        Hemoglobinopathy Fractionation        Hemoglobinopathy (genetic testing)        Cystic fibrosis                   Fetal testing        Test Value Reference Range Date Time    NIPT        MSAFP        AFP-4                  2nd and 3rd Trimester       Test Value Reference Range Date Time    Hemoglobin (repeated)  10.8 g/dL 12.0 - 15.9 24 0959    Hematocrit (repeated)  31.6 % 34.0 - 46.6 24 0959    Platelets   183 10*3/mm3 140 - 450 24 0959       340 10*3/mm3 140 - 450 24 1637    1 hour GTT   214 mg/dL 74 - 180 24 1022       152 mg/dL 65 - 139 24 1043    Antibody Screen (repeated)        3rd TM  syphilis scrn (repeated)  RPR         3rd TM syphilis scrn (repeated) FTA        GTT Fasting        GTT 1 Hr        GTT 2 Hr        GTT 3 Hr  100 mg/dL 74 - 140 04/05/24 1223    Group B Strep                  Other testing        Test Value Reference Range Date Time    Parvo IgG         CMV IgG                   Drug Screening       Test Value Reference Range Date Time    Amphetamine Screen  Negative  Negative 02/07/24 1637    Barbiturate Screen  Negative  Negative 02/07/24 1637    Benzodiazepine Screen  Negative  Negative 02/07/24 1637    Methadone Screen  Negative  Negative 02/07/24 1637    Phencyclidine Screen  Negative  Negative 02/07/24 1637    Opiates Screen  Negative  Negative 02/07/24 1637    THC Screen  Negative  Negative 02/07/24 1637    Cocaine Screen  Negative  Negative 02/07/24 1637    Propoxyphene Screen        Buprenorphine Screen  Negative  Negative 02/07/24 1637    Methamphetamine Screen  Negative  Negative 02/07/24 1637    Oxycodone Screen  Negative  Negative 02/07/24 1637    Tricyclic Antidepressants Screen  Negative  Negative 02/07/24 1637              Legend    ^: Historical                          External Prenatal Results       Pregnancy Outside Results - Transcribed From Office Records - See Scanned Records For Details       Test Value Date Time    ABO  O  02/07/24 1637    Rh  Positive  02/07/24 1637    Antibody Screen  Negative  02/07/24 1637    Varicella IgG       Rubella  1.70 index 02/07/24 1637    Hgb  10.8 g/dL 06/26/24 0959       9.7 g/dL 02/07/24 1637    Hct  31.6 % 06/26/24 0959       29.6 % 02/07/24 1637    HgB A1c   5.5 % 05/09/24 0832       5.70 % 02/07/24 1637    1h GTT  214 mg/dL 04/05/24 1022       152 mg/dL 03/26/24 1043    3h GTT Fasting       3h GTT 1 hour       3h GTT 2 hour       3h GTT 3 hour   100 mg/dL 04/05/24 1223    Gonorrhea (discrete)  Negative  02/07/24 1637    Chlamydia (discrete)  Negative  02/07/24 1637    RPR       Syphilis Antibody       HBsAg  Non-Reactive   24 1637    Herpes Simplex Virus PCR       Herpes Simplex VIrus Culture       HIV  Non-Reactive  24 1637    Hep C RNA Quant PCR       Hep C Antibody  Non-Reactive  24 1637    AFP       NIPT       Cystic Fibroisis        Group B Strep       GBS Susceptibility to Clindamycin       GBS Susceptibility to Erythromycin       Fetal Fibronectin       Genetic Testing, Maternal Blood                 Drug Screening       Test Value Date Time    Urine Drug Screen       Amphetamine Screen  Negative  24 1637    Barbiturate Screen  Negative  24 1637    Benzodiazepine Screen  Negative  24 1637    Methadone Screen  Negative  24 1637    Phencyclidine Screen  Negative  24 1637    Opiates Screen  Negative  24 1637    THC Screen  Negative  24 1637    Cocaine Screen       Propoxyphene Screen       Buprenorphine Screen  Negative  24 1637    Methamphetamine Screen       Oxycodone Screen  Negative  24 1637    Tricyclic Antidepressants Screen  Negative  24 1637              Legend    ^: Historical                             OB History:                     OB History    Para Term  AB Living   4 2 2 0 1 2   SAB IAB Ectopic Molar Multiple Live Births   1 0 0 0 0 2      # Outcome Date GA Lbr Edmar/2nd Weight Sex Type Anes PTL Lv   4 Current            3 SAB 2014     SAB      2 Term 13   3289 g (7 lb 4 oz) M Vag-Spont EPI N BRIGIDA   1 Term 11   3374 g (7 lb 7 oz) F Vag-Spont EPI N BRIGIDA      Name: Luz      Allergies:                      No Known Allergies   Past Medical History: Past Medical History:   Diagnosis Date    Anxiety     Cholelithiasis 2016    Depression     Dislocation of right knee     Pap smear for cervical cancer screening 2016      Past Surgical History Past Surgical History:   Procedure Laterality Date    CHOLECYSTECTOMY  2016    KNEE SURGERY Right 2014    PATELLAR TENDON GRAFT IMPLANTATION  2014    VAGINAL DELIVERY       x2    WISDOM TOOTH EXTRACTION        Family History: Family History   Problem Relation Age of Onset    Hypertension Mother     Diabetes Father     Diabetes Maternal Grandmother     Hypertension Maternal Grandmother     Diabetes Paternal Grandmother       Social History:  reports that she has never smoked. She has never been exposed to tobacco smoke. She has never used smokeless tobacco.   reports current alcohol use of about 2.0 standard drinks of alcohol per week.   reports no history of drug use.    General ROS: Pertinent items are noted in HPI, all other systems reviewed and negative   Medications: Insulin Glargine, Insulin Lispro (1 Unit Dial), Ketone Test, Lancets, Pen Needles, cetirizine, docusate sodium, ferrous sulfate, fluticasone, glucose blood, polyethylene glycol, and prenatal vitamin 28-0.8       Objective      Vital Signs Range for the last 24 hours  Temperature: Temp:  [98.1 °F (36.7 °C)] 98.1 °F (36.7 °C)   BP: BP: (155)/(92) 155/92   Pulse: Heart Rate:  [67] 67   Respirations: Resp:  [16] 16   SPO2:                 Physical Examination: General appearance - alert, well appearing, and in no distress  Chest - clear to auscultation, no wheezes, rales or rhonchi, symmetric air entry  Heart - normal rate, regular rhythm, normal S1, S2, no murmurs, rubs, clicks or gallops  Abdomen - Soft, gravid, nontender  Extremities - pedal edema tr +      Presentation: VTX/Transverse on US   Cervix: Exam by: Method: sterile exam per CNM   Dilation: Cervical Dilation (cm): 1   Effacement:     Station:         Fetal Heart Rate Assessment           Baseline:  normal   Variability:  moderate   Accels:  present   Decels:  absent   Tracing Category:  1 x2     Uterine Assessment   Method: Method: external tocotransducer, palpation   Frequency (min):     Ctx Count in 10 min:       Laboratory Results:  WBC   Date Value Ref Range Status   06/26/2024 7.28 3.40 - 10.80 10*3/mm3 Final     RBC   Date Value Ref Range Status    2024 3.61 (L) 3.77 - 5.28 10*6/mm3 Final     Hemoglobin   Date Value Ref Range Status   2024 10.8 (L) 12.0 - 15.9 g/dL Final     Hematocrit   Date Value Ref Range Status   2024 31.6 (L) 34.0 - 46.6 % Final     MCV   Date Value Ref Range Status   2024 87.5 79.0 - 97.0 fL Final     MCH   Date Value Ref Range Status   2024 29.9 26.6 - 33.0 pg Final     MCHC   Date Value Ref Range Status   2024 34.2 31.5 - 35.7 g/dL Final     RDW   Date Value Ref Range Status   2024 15.2 12.3 - 15.4 % Final     RDW-SD   Date Value Ref Range Status   2024 47.8 37.0 - 54.0 fl Final     MPV   Date Value Ref Range Status   2024 10.5 6.0 - 12.0 fL Final     Platelets   Date Value Ref Range Status   2024 183 140 - 450 10*3/mm3 Final             Assessment/Plan:   IUP 37w1d with DC/DA Twins and A2DM    1.Admit and proceed with delivery  2.long discussion had over several hours about mode of delivery. Discussion included r/b of CD and  with possible breech extraction. She has elected to proceed with PCD  3.FWB reassuring x2  4. A2DM: FSBS q4 hr for now  5. Desires sterilization. Consents had been signed in office. Will proceed with this during CD  6. Tylenol, ancef, bicitra      Virginia Mensah MD  2024  10:59 EDT      Electronically signed by Virginia Mensah MD at 24 1104          Operative/Procedure Notes (last 24 hours)        Virginia Mensah MD at 24 1156          [facility]   Section Operative Note    Pre-Operative Dx:   1, DC/DA Twin Pregnancy  2. A2DM on insulin  3. Desired sterilization         Postoperative dx:    1.  Same     Procedure: Procedure(s):   SECTION PRIMARY WITH BILATERAL SALPINGECTOMY     Surgeon/Assistant: Surgeon(s):  Virginia Mensah MD         Anesthesia:  Anesthesiologist: Spinal  Anesthesiologist: Rayna Gayle DO  CRNA: Zuleima Del Castillo CRNA         QBL:  Not calculated at time of  "note.         IV Fluids: 800 mls.   UOP: 250 mls.    I/O this shift:  In: 800 [I.V.:800]  Out: 250 [Urine:250]   Antibiotics: cefazolin (Ancef)     Infant:           Gender:    Ravindra Jett [[id number]]   female      Les Jett [[id number]]   male  infant    Weight:    Ravindra Jett [[id number]]   No birth weight on file.      Les Jett [[id number]]   No birth weight on file.     Apgars:    Ravindra Jett [[id number]]   8      Les Jett [[id number]]   8  @ 1 minute /        Ravindra Jett [[id number]]   9      Les Jett [[id number]]   9  @ 5 minutes    Cord gases: Venous:  No results found for: \"PHCVEN\", \"BECVEN\"     Arterial:  No results found for: \"PHCART\", \"BECART\"     Indication for C/Section:    Twin Pregnancy     Priority for C/Section:    routine     Procedure Details:   The patient was taken to the operating room after risks and benefits have been reviewed. The consent was reviewed and had been signed. Time Out was performed. She was prepped and draped in the normal sterile fashion in the dorsal supine position and a leftward tilt.  A Pfannenstiel skin incision made 2 cm above the pubic symphysis and carried down to the underlying layer of fascia with the scalpel.  The fascia was nicked in the midline and the incision was extended laterally with Amin scissors.  The anterior aspect of the incision was grasped with Kocher clamps x2 and elevated and the underlying rectus muscles were dissected off sharply and bluntly.  The inferior aspect of the incision was treated similarly.  The peritoneum was identified and entered bluntly the incision was extended with lateral traction.  A bladder blade was inserted.  The lower uterine segment was identified and incised in a transverse fashion with the scalpel.  The uterine cavity was entered bluntly and the incision was extended with cephalocaudad traction.  Fetus A was then delivered " atraumatically.  Cord was clamped and cut after 60 second delay and the infant was handed off to DRT staff for evaluation. Twin B then delivered from vertex position. Cord blood was obtained.  The placentas were delivered via uterine massage.  The uterus was then exteriorized and cleared of all clot and debris with clean laparotomy sponges.  The hysterotomy was closed in a single layer with a #1 chromic in a running locking fashion.    The posterior cul-de-sac was irrigated and aspirated.  The hysterotomy was again inspected and noted to be hemostatic.  The left fallopian tubes was identified and followed out to the fimbria. Tube was removed in its entirety with EnSeal device . Hemostasis noted at the seal line. The same procedure was performed on the right. Tubes handed off for pathologic review.The uterus was returned to the abdomen.  The paracolic gutters were irrigated and aspirated.  Hysterotomy was inspected for third time and noted to be hemostatic.  The peritoneum was closed with 2-0 chromic in a running fashion.  The rectus muscles underlying the fascia were made hemostatic with Bovie electrocautery.  The fascia was then closed with 1 Vicryl in a running stitch.  The subcutaneous tissues were irrigated aspirated and made hemostatic with Bovie electrocautery.  The subcutaneous tissues were reapproximated with 3-0 Vicryl and the skin was closed with 4-0 Monocryl in a subcuticular fashion and sealed with Dermabond.  All counts were correct and the patient was taken to the recovery room in stable condition.          Complications:   None     Specimens: bilateral fallopian tubes     Disposition:   Mother to Mother Baby/Postpartum  in stable condition currently.   Babies to NBN  in stable condition currently.       Virginia Mensah MD  6/26/2024  13:44 EDT        Electronically signed by Virginia Mensah MD at 06/26/24 6404       Physician Progress Notes (last 24 hours)  Notes from 06/26/24 0644 through 06/27/24  0644   No notes of this type exist for this encounter.

## 2024-06-27 NOTE — ANESTHESIA POSTPROCEDURE EVALUATION
Patient: Neema Jett    Procedure Summary       Date: 24 Room / Location: [facility] LABOR DELIVERY   [facility] LABOR DELIVERY    Anesthesia Start: 1301 Anesthesia Stop: 1352    Procedure:  SECTION PRIMARY WITH TUBAL (Bilateral: Abdomen) Diagnosis:     Surgeons: Virginia Mensah MD Provider: Rayna Gayle DO    Anesthesia Type: ITN, spinal ASA Status: 3            Anesthesia Type: ITN, spinal    Vitals  Vitals Value Taken Time   /77 24 1242   Temp 98.2 °F (36.8 °C) 24 1242   Pulse 64 24 1242   Resp 16 24 1242   SpO2 98 % 24 1551   Vitals shown include unfiled device data.        Post Anesthesia Care and Evaluation    Patient location during evaluation: bedside  Patient participation: complete - patient participated  Level of consciousness: awake  Pain score: 5 (at rest)  Pain management: satisfactory to patient    Airway patency: patent  Anesthetic complications: No anesthetic complications  PONV Status: none  Cardiovascular status: acceptable and hemodynamically stable  Respiratory status: acceptable  Hydration status: acceptable  Post Neuraxial Block status: Motor and sensory function returned to baseline and No signs or symptoms of PDPH

## 2024-06-27 NOTE — PROGRESS NOTES
2024    Name:Neema Jett    MR#:[id number]     PROGRESS NOTE:  Post-Op 1 S/P        Subjective   28 y.o. yo Female  s/p CS at 37w1d doing well. Pain well controlled, lochia appropriate. She denies lightheadedness and dizziness though she has not been up out of bed yet. She is breast and formula feeding.       Dichorionic diamniotic twin pregnancy in third trimester    Gestational diabetes mellitus (GDM) in childbirth, insulin controlled    Status post primary low transverse  section    Postpartum anemia        Objective    Vitals  Temp:  Temp:  [97.4 °F (36.3 °C)-98.4 °F (36.9 °C)] 98.2 °F (36.8 °C)  Temp src: Oral  BP:  BP: ()/(47-92) 122/74  Pulse:  Heart Rate:  [48-74] 74  RR:   Resp:  [16-18] 16    General Awake, alert, no distress  Abdomen Soft, non-distended, fundus firm, below umbilicus, appropriately tender  Incision  Intact, no erythema or exudate  Extremities Calves NT bilaterally     I/O last 3 completed shifts:  In: 800 [I.V.:800]  Out: 2722 [Urine:2400; Blood:322]    Lab Results   Component Value Date    WBC 8.60 2024    HGB 7.7 (L) 2024    HCT 22.7 (L) 2024    MCV 88.0 2024     2024    POCGLU 69 (L) 2024    CREATININE 0.68 2023    AST 35 (H) 2023    ALT 37 (H) 2023    HEPBSAG Non-Reactive 2024     Results from last 7 days   Lab Units 24  0959   ABO TYPING  O   RH TYPING  Positive   ANTIBODY SCREEN  Negative       Infant:      Ravindra Jett TY [[id number]]   female      Les Jett RENETTA [[id number]]   male       Assessment   1.  POD 1 from  Section   2.  Anemia, POA    Plan: Doing well.  Ferrous sulfate 325mg PO daily.   D/C iv, advance diet, may shower.          Nancy Valencia CNM  2024 08:30 EDT   Detail Level: Zone

## 2024-06-28 PROCEDURE — 25010000002 ENOXAPARIN PER 10 MG: Performed by: OBSTETRICS & GYNECOLOGY

## 2024-06-28 PROCEDURE — 63710000001 PROMETHAZINE PER 12.5 MG: Performed by: OBSTETRICS & GYNECOLOGY

## 2024-06-28 RX ORDER — IBUPROFEN 600 MG/1
600 TABLET ORAL EVERY 6 HOURS
Qty: 60 TABLET | Refills: 0 | Status: SHIPPED | OUTPATIENT
Start: 2024-06-28

## 2024-06-28 RX ORDER — FERROUS SULFATE 325(65) MG
325 TABLET ORAL
Qty: 30 TABLET | Refills: 0 | Status: SHIPPED | OUTPATIENT
Start: 2024-06-28

## 2024-06-28 RX ORDER — POLYETHYLENE GLYCOL 3350 17 G/17G
17 POWDER, FOR SOLUTION ORAL DAILY
Status: DISCONTINUED | OUTPATIENT
Start: 2024-06-28 | End: 2024-06-29 | Stop reason: HOSPADM

## 2024-06-28 RX ORDER — OXYCODONE HYDROCHLORIDE 5 MG/1
5 TABLET ORAL EVERY 6 HOURS PRN
Qty: 12 TABLET | Refills: 0 | Status: SHIPPED | OUTPATIENT
Start: 2024-06-28 | End: 2024-07-03

## 2024-06-28 RX ORDER — PSEUDOEPHEDRINE HCL 30 MG
100 TABLET ORAL 2 TIMES DAILY
Qty: 60 CAPSULE | Refills: 0 | Status: SHIPPED | OUTPATIENT
Start: 2024-06-28

## 2024-06-28 RX ADMIN — OXYCODONE HYDROCHLORIDE 10 MG: 10 TABLET ORAL at 13:16

## 2024-06-28 RX ADMIN — OXYCODONE HYDROCHLORIDE 5 MG: 5 TABLET ORAL at 09:18

## 2024-06-28 RX ADMIN — ACETAMINOPHEN 650 MG: 325 TABLET, FILM COATED ORAL at 19:22

## 2024-06-28 RX ADMIN — PROMETHAZINE HYDROCHLORIDE 12.5 MG: 12.5 TABLET ORAL at 13:16

## 2024-06-28 RX ADMIN — IBUPROFEN 600 MG: 600 TABLET, FILM COATED ORAL at 15:14

## 2024-06-28 RX ADMIN — ACETAMINOPHEN 650 MG: 325 TABLET, FILM COATED ORAL at 13:11

## 2024-06-28 RX ADMIN — IBUPROFEN 600 MG: 600 TABLET, FILM COATED ORAL at 21:03

## 2024-06-28 RX ADMIN — PRENATAL VITAMINS-IRON FUMARATE 27 MG IRON-FOLIC ACID 0.8 MG TABLET 1 TABLET: at 09:13

## 2024-06-28 RX ADMIN — FERROUS SULFATE TAB 325 MG (65 MG ELEMENTAL FE) 325 MG: 325 (65 FE) TAB at 09:13

## 2024-06-28 RX ADMIN — POLYETHYLENE GLYCOL 3350 17 G: 17 POWDER, FOR SOLUTION ORAL at 09:13

## 2024-06-28 RX ADMIN — DOCUSATE SODIUM 100 MG: 100 CAPSULE, LIQUID FILLED ORAL at 20:50

## 2024-06-28 RX ADMIN — SIMETHICONE 80 MG: 80 TABLET, CHEWABLE ORAL at 20:51

## 2024-06-28 RX ADMIN — ENOXAPARIN SODIUM 40 MG: 100 INJECTION SUBCUTANEOUS at 04:30

## 2024-06-28 RX ADMIN — ENOXAPARIN SODIUM 40 MG: 100 INJECTION SUBCUTANEOUS at 16:43

## 2024-06-28 RX ADMIN — IBUPROFEN 600 MG: 600 TABLET, FILM COATED ORAL at 04:30

## 2024-06-28 RX ADMIN — DOCUSATE SODIUM 100 MG: 100 CAPSULE, LIQUID FILLED ORAL at 09:13

## 2024-06-28 RX ADMIN — ACETAMINOPHEN 650 MG: 325 TABLET, FILM COATED ORAL at 06:41

## 2024-06-28 RX ADMIN — ACETAMINOPHEN 650 MG: 325 TABLET, FILM COATED ORAL at 01:20

## 2024-06-28 RX ADMIN — OXYCODONE HYDROCHLORIDE 10 MG: 10 TABLET ORAL at 04:30

## 2024-06-28 RX ADMIN — CETIRIZINE HYDROCHLORIDE 10 MG: 10 TABLET, FILM COATED ORAL at 20:50

## 2024-06-28 RX ADMIN — IBUPROFEN 600 MG: 600 TABLET, FILM COATED ORAL at 09:18

## 2024-06-28 NOTE — DISCHARGE SUMMARY
Occupational Therapy Visit    Visit Type: Daily Treatment Note  Visit: 4  Referring Provider: Aurelia Jain MD  Medical Diagnosis (from order): Diagnosis Information    Diagnosis  719.41 (ICD-9-CM) - M25.512 (ICD-10-CM) - Left shoulder pain         SUBJECTIVE                                                                                                               OT session 4/8  \"I was a little sore after last time\"     OBJECTIVE                                                                                                                                        Treatment    Moist Heat (10372)  - Location: L shoulder  - Duration: 12    Results: decreased pain, tissue softening and improved range of motion  Reaction: no adverse reaction to treatment      Therapeutic Exercise  UBE 2 for/rev 1.0  Supine on towel roll HA/flex x 10  TRX ext stretch X 10, 5 sec hold  Doorframe stretch X 10, 10 sec hold  Green theraband rows/ext x 15      Manual Therapy   STM to shoulder to decrease pain and soft tissue tightness.      Skilled input: verbal instruction/cues    Writer verbally educated and received verbal consent for hand placement, positioning of patient, and techniques to be performed today from patient for modality application and hand placement and palpation for techniques as described above and how they are pertinent to the patient's plan of care.  Home Exercise Program  Supine cane flex X 10, 10 sec hold  Corner stretch X 10, 10 sec hold  Active scap retraction X 10, 5 sec hold  Active shoulder ext x 10    Doorframe  IR stretch    Green theraband row/ext      ASSESSMENT                                                                                                            Pt was seen for OT session with focus on pain management with MHP, decreasing soft tissue tightness with STM and therex to increase ROM and strength for improved ADL performance.  Pt reports soreness following last session so educated  [facility]  Discharge Summary      Patient: Neema Jett            : 1995  MRN: [id number]  CSN: [id number]  Consult:   Consults       No orders found from 2024 to 2024.               Gestational Age at Discharge:  37w1d, delivered      Admission  Diagnosis: Term pregnancy    Discharge Diagnosis:   Patient Active Problem List   Diagnosis    Dichorionic diamniotic twin pregnancy in third trimester    Gestational diabetes mellitus (GDM) in childbirth, insulin controlled    Status post primary low transverse  section    Postpartum anemia       Date of Admission: 2024    Date of Discharge:  24    Procedures:  PCD           Service:  Obstetrics    Hospital Course:       Pt admitted for  Delivery  after pregnancy c/b DC/DA Twin pregnancy and A2DM/ She was delivered without complication. She delivered a VFI and VMI with Apgars 8/9, 8/9 via PCD. See note for full detail. Her postpartum course was uncomplicated and was discharged home on PPD 2    Labs:    Lab Results (last 24 hours)       Procedure Component Value Units Date/Time    POC Glucose Once [[id number]]  (Normal) Collected: 24 1026    Specimen: Blood Updated: 24 1027     Glucose 119 mg/dL           HOSPITAL LABS:  Lab Results   Component Value Date    WBC 8.60 2024    HGB 7.7 (L) 2024    HCT 22.7 (L) 2024    MCV 88.0 2024     2024    POCGLU 119 2024    CREATININE 0.68 2023    AST 35 (H) 2023    ALT 37 (H) 2023     Results from last 7 days   Lab Units 24  0959   ABO TYPING  O   RH TYPING  Positive   ANTIBODY SCREEN  Negative       IMAGING        Discharge Medications     Discharge Medications        New Medications        Instructions Start Date   docusate sodium 100 MG capsule   100 mg, Oral, 2 Times Daily      ferrous sulfate 325 (65 FE) MG tablet   325 mg, Oral, Daily With Breakfast      ibuprofen 600 MG tablet  Commonly known as: ADVIL,MOTRIN    600 mg, Oral, Every 6 Hours      oxyCODONE 5 MG immediate release tablet  Commonly known as: ROXICODONE   5 mg, Oral, Every 6 Hours PRN             Continue These Medications        Instructions Start Date   cetirizine 10 MG tablet  Commonly known as: zyrTEC   10 mg, Oral, Daily      fluticasone 50 MCG/ACT nasal spray  Commonly known as: FLONASE   1 spray, Nasal, Daily      Lancets misc   Use as instructed 4 times a day      Pen Needles 32G X 4 MM misc   1 each, Does not apply, 3 Times Daily      prenatal vitamin 28-0.8 28-0.8 MG tablet tablet   1 tablet, Oral, Daily             Stop These Medications      glucose blood test strip     Insulin Glargine 100 UNIT/ML injection pen  Commonly known as: LANTUS SOLOSTAR     Insulin Lispro (1 Unit Dial) 100 UNIT/ML solution pen-injector  Commonly known as: HumaLOG KwikPen     Ketone Test strip              Allergies: No Known Allergies     Discharge Disposition:  To Home    Discharge Condition:  Stable    Discharge Diet: Regular    Activity at Discharge: pelvic rest, no lifting    Follow-up Appointments: 2 wk        Virginia Mensah MD  06/28/24  08:37 EDT     not to push exercises to high pain levels.  Pt able to perform scapular strengthening without significant increase in pain. Pt issued green theraband for home. pt demonstrates good return.  Pt would benefit from cont OT to improve deficit areas and max I.    Education:   - Results of above outlined education: Verbalizes understanding and Demonstrates understanding    PLAN                                                                                                                           Suggestions for next session as indicated: Progress per plan of care       Therapy procedure time and total treatment time can be found documented on the Time Entry flowsheet

## 2024-06-28 NOTE — LACTATION NOTE
06/28/24 0945   Maternal Information   Date of Referral 06/28/24   Person Making Referral lactation consultant  (courtesy)   Maternal Reason for Referral multiple birth  (Mom has not been pumping or nursing.  States she desires to breastfeed when her milk comes in.  Stressed the importance of at least pumping now for supply and demand.)   Infant Reason for Referral 35-37 weeks gestation;low birth weight   Maternal Infant Feeding   Maternal Emotional State receptive   Milk Expression/Equipment   Breast Pump Type double electric, hospital grade   Equipment for Home Use (S)  needs breast pump  (desires HF pump.  Has not ordered one yet.  Encouraged mom to order one today.)   Breast Pumping   Breast Pumping Interventions frequent pumping encouraged  (encouraged q 2-3 hour pumping)

## 2024-06-29 VITALS
DIASTOLIC BLOOD PRESSURE: 72 MMHG | SYSTOLIC BLOOD PRESSURE: 141 MMHG | RESPIRATION RATE: 18 BRPM | HEART RATE: 82 BPM | TEMPERATURE: 98 F | OXYGEN SATURATION: 99 %

## 2024-06-29 PROCEDURE — 25010000002 ENOXAPARIN PER 10 MG: Performed by: OBSTETRICS & GYNECOLOGY

## 2024-06-29 RX ADMIN — ENOXAPARIN SODIUM 40 MG: 100 INJECTION SUBCUTANEOUS at 04:41

## 2024-06-29 RX ADMIN — PRENATAL VITAMINS-IRON FUMARATE 27 MG IRON-FOLIC ACID 0.8 MG TABLET 1 TABLET: at 08:09

## 2024-06-29 RX ADMIN — DOCUSATE SODIUM 100 MG: 100 CAPSULE, LIQUID FILLED ORAL at 08:09

## 2024-06-29 RX ADMIN — FERROUS SULFATE TAB 325 MG (65 MG ELEMENTAL FE) 325 MG: 325 (65 FE) TAB at 08:09

## 2024-06-29 RX ADMIN — ACETAMINOPHEN 650 MG: 325 TABLET, FILM COATED ORAL at 00:38

## 2024-06-29 RX ADMIN — ACETAMINOPHEN 650 MG: 325 TABLET, FILM COATED ORAL at 06:40

## 2024-06-29 RX ADMIN — OXYCODONE HYDROCHLORIDE 10 MG: 10 TABLET ORAL at 09:32

## 2024-06-29 RX ADMIN — IBUPROFEN 600 MG: 600 TABLET, FILM COATED ORAL at 04:41

## 2024-06-29 RX ADMIN — IBUPROFEN 600 MG: 600 TABLET, FILM COATED ORAL at 09:32

## 2024-06-29 RX ADMIN — OXYCODONE HYDROCHLORIDE 10 MG: 10 TABLET ORAL at 04:48

## 2024-06-29 RX ADMIN — POLYETHYLENE GLYCOL 3350 17 G: 17 POWDER, FOR SOLUTION ORAL at 08:09

## 2024-06-29 NOTE — LACTATION NOTE
06/29/24 1100   Maternal Information   Date of Referral 06/29/24   Person Making Referral patient  (requested a pump for home)   Maternal Reason for Referral multiple birth   Infant Reason for Referral 35-37 weeks gestation;low birth weight   Maternal Infant Feeding   Maternal Emotional State receptive   Milk Expression/Equipment   Breast Pump Type manual pump;double electric, personal  (RX Spectra provided)   Equipment for Home Use breast pump provided  (provided a RX Spectra via [employer] stock.  Discussed this is through insurance and will not be able to get a HF pump through insurance with this delivery.  Can purchase a HF pump with self pay if desires.  Also provided a manual pump)   Breast Pumping   Breast Pumping Interventions frequent pumping encouraged     Patient called for lactation desiring a breast pump prior to discharge.  Provided a manual pump.  Discussed a RX Spectra versus a handsfree pump.  Patient decided she would like to get a Spectra double electric breast pump at this time.  Encouraged the use of a pumping vest or a pumping bra.  All questions/concerns answered at this time.

## 2024-06-29 NOTE — DISCHARGE SUMMARY
[facility]   Discharge Summary      Patient: Neema Jett      MR#:[id number]  Admission  Diagnosis: Term pregnancy  Discharge Diagnosis:   1. Status post primary low transverse  section    2. Twin pregnancy, dichorionic/diamniotic, unspecified trimester        Date of Admission: 2024  Date of Discharge:  2024    Procedures:     Ravindra Jett A [[id number]]   , Low Transverse      Les Jett [[id number]]   , Low Transverse        Ravindra Jett [[id number]]   2024      Les Jett [[id number]]   2024       Ravindra Jett [[id number]]   1:21 PM      Les Jett [[id number]]   1:22 PM      Service:  Obstetrics    Hospital Course:  Patient underwent  section and remained in the hospital for 3 days.  During that time she remained afebrile and hemodynamically stable.  On the day of discharge, she was eating, ambulating and voiding without difficulty.  She is breastfeeding and supplementing with formula as needed.       Labs:    Lab Results (last 24 hours)       ** No results found for the last 24 hours. **            Discharge Medications     Discharge Medications        New Medications        Instructions Start Date   docusate sodium 100 MG capsule   100 mg, Oral, 2 Times Daily      ferrous sulfate 325 (65 FE) MG tablet   325 mg, Oral, Daily With Breakfast      ibuprofen 600 MG tablet  Commonly known as: ADVIL,MOTRIN   600 mg, Oral, Every 6 Hours      oxyCODONE 5 MG immediate release tablet  Commonly known as: ROXICODONE   5 mg, Oral, Every 6 Hours PRN             Continue These Medications        Instructions Start Date   cetirizine 10 MG tablet  Commonly known as: zyrTEC   10 mg, Oral, Daily      fluticasone 50 MCG/ACT nasal spray  Commonly known as: FLONASE   1 spray, Nasal, Daily      Lancets misc   Use as instructed 4 times a day      Pen Needles 32G X 4 MM misc   1 each, Does not apply, 3 Times Daily       prenatal vitamin 28-0.8 28-0.8 MG tablet tablet   1 tablet, Oral, Daily             Stop These Medications      glucose blood test strip     Insulin Glargine 100 UNIT/ML injection pen  Commonly known as: LANTUS SOLOSTAR     Insulin Lispro (1 Unit Dial) 100 UNIT/ML solution pen-injector  Commonly known as: HumaLOG KwikPen     Ketone Test strip              Discharge Disposition:  To Home    Discharge Condition:  Stable. Anemia, POA, taking ferrous sulfate.     Discharge Diet: regular    Activity at Discharge: pelvic rest. No driving for 2 weeks.     Follow-up Appointments  Future Appointments   Date Time Provider Department Center   8/28/2024 11:30 AM Anabel Reyes MD [facility]   Incision check in 2 weeks.       Nancy Valencia CNM  06/29/24  09:57 EDT

## 2024-07-01 LAB
CYTO UR: NORMAL
LAB AP CASE REPORT: NORMAL
LAB AP CLINICAL INFORMATION: NORMAL
PATH REPORT.FINAL DX SPEC: NORMAL
PATH REPORT.GROSS SPEC: NORMAL

## 2024-07-09 ENCOUNTER — MATERNAL SCREENING (OUTPATIENT)
Dept: CALL CENTER | Facility: HOSPITAL | Age: 29
End: 2024-07-09
Payer: COMMERCIAL

## 2024-07-09 NOTE — OUTREACH NOTE
Maternal Screening Survey      Flowsheet Row Responses   Facility patient discharged from? [facility]   Attempt successful? No   Unsuccessful attempts Attempt 1              Millie PORTILLO - Registered Nurse

## 2024-07-09 NOTE — OUTREACH NOTE
Maternal Screening Survey      Flowsheet Row Responses   Facility patient discharged from? [facility]   Attempt successful? No   Unsuccessful attempts Attempt 2              Millie PORTILLO - Registered Nurse

## 2024-07-10 ENCOUNTER — MATERNAL SCREENING (OUTPATIENT)
Dept: CALL CENTER | Facility: HOSPITAL | Age: 29
End: 2024-07-10
Payer: COMMERCIAL

## 2024-07-10 NOTE — OUTREACH NOTE
Maternal Screening Survey      Flowsheet Row Responses   Facility patient discharged from? [place]   Attempt successful? Yes   Call start time 1003   Call end time 1004   EPD Scale: Able to Laugh 0-->as much as she always could   EPD Scale: Looked Forward 0-->as much as she ever did   EPD Scale: Blamed Self 0-->no, never   EPD Scale: Been Anxious 0-->no, not at all   EPD Scale: Felt Panicky 0-->no, not at all   EPD Scale: Things Getting on Top 0-->no, has been coping as well as ever   EPD Scale: Difficulty Sleeping 0-->no, not at all   EPD Scale: Sad or Miserable 0-->no, not at all   EPD Scale: Crying 0-->no, never   EPD Scale: Thought of Harming Self 0-->never   [place]  Depression Score 0   Did any of your parents have problems with alcohol or drug use? No   Do any of your peers have problems with alcohol or drug use? No   Does your partner have problems with alcohol or drug use? No   Before you were pregnant did you have problems with alcohol or drug use? (past) No   In the past month, did you drink beer, wine, liquor or use any other drugs? (pregnancy) No   Maternal Screening call completed Yes   Call end time 1004              Millie PORTILLO - Registered Nurse

## 2024-07-29 NOTE — PROGRESS NOTES
Is This A New Presentation, Or A Follow-Up?: Skin Lesions RANDI Villalobos  Obstetric Progress Note    Chief Complaint: POD 2    Subjective   Feeling well. Pain controlled. Lali diet +amb. Lochia appr.     Objective     Vital Signs Range for the last 24 hours  Temp:  [98.2 °F (36.8 °C)-98.7 °F (37.1 °C)] 98.4 °F (36.9 °C)   BP: (123-139)/(62-79) 123/71   Heart Rate:  [64-80] 71   Resp:  [16-18] 18                   Intake/Output last 24 hours:      Intake/Output Summary (Last 24 hours) at 6/28/2024 0754  Last data filed at 6/27/2024 1556  Gross per 24 hour   Intake --   Output 2050 ml   Net -2050 ml       Intake/Output this shift:    No intake/output data recorded.    Physical Exam:  General: No acute distress   Heart RRR   Lungs CTAB     Abdomen Soft, appropriately tender, fundus firm, incision CDI   Extremities Exam of extremities: pedal edema re +       Laboratory Results:  WBC   Date Value Ref Range Status   06/27/2024 8.60 3.40 - 10.80 10*3/mm3 Final     RBC   Date Value Ref Range Status   06/27/2024 2.58 (L) 3.77 - 5.28 10*6/mm3 Final     Hemoglobin   Date Value Ref Range Status   06/27/2024 7.7 (L) 12.0 - 15.9 g/dL Final     Hematocrit   Date Value Ref Range Status   06/27/2024 22.7 (L) 34.0 - 46.6 % Final     MCV   Date Value Ref Range Status   06/27/2024 88.0 79.0 - 97.0 fL Final     MCH   Date Value Ref Range Status   06/27/2024 29.8 26.6 - 33.0 pg Final     MCHC   Date Value Ref Range Status   06/27/2024 33.9 31.5 - 35.7 g/dL Final     RDW   Date Value Ref Range Status   06/27/2024 15.2 12.3 - 15.4 % Final     RDW-SD   Date Value Ref Range Status   06/27/2024 47.8 37.0 - 54.0 fl Final     MPV   Date Value Ref Range Status   06/27/2024 10.8 6.0 - 12.0 fL Final     Platelets   Date Value Ref Range Status   06/27/2024 153 140 - 450 10*3/mm3 Final     Neutrophil %   Date Value Ref Range Status   06/27/2024 72.6 42.7 - 76.0 % Final     Lymphocyte %   Date Value Ref Range Status   06/27/2024 19.9 19.6 - 45.3 % Final     Monocyte %   Date Value Ref Range Status   06/27/2024  5.2 5.0 - 12.0 % Final     Eosinophil %   Date Value Ref Range Status   06/27/2024 1.2 0.3 - 6.2 % Final     Basophil %   Date Value Ref Range Status   06/27/2024 0.5 0.0 - 1.5 % Final     Immature Grans %   Date Value Ref Range Status   06/27/2024 0.6 (H) 0.0 - 0.5 % Final     Neutrophils, Absolute   Date Value Ref Range Status   06/27/2024 6.25 1.70 - 7.00 10*3/mm3 Final     Lymphocytes, Absolute   Date Value Ref Range Status   06/27/2024 1.71 0.70 - 3.10 10*3/mm3 Final     Monocytes, Absolute   Date Value Ref Range Status   06/27/2024 0.45 0.10 - 0.90 10*3/mm3 Final     Eosinophils, Absolute   Date Value Ref Range Status   06/27/2024 0.10 0.00 - 0.40 10*3/mm3 Final     Basophils, Absolute   Date Value Ref Range Status   06/27/2024 0.04 0.00 - 0.20 10*3/mm3 Final     Immature Grans, Absolute   Date Value Ref Range Status   06/27/2024 0.05 0.00 - 0.05 10*3/mm3 Final     nRBC   Date Value Ref Range Status   06/27/2024 0.0 0.0 - 0.2 /100 WBC Final           Assessment/Plan: POD 2 s/p PCD  RPOC advance care  2. Anemia: POA worsened with acute blood loss: asx on iron  3. Breast  4. No circ  5. Dc home tomorrow        Virginia Mensah MD  6/28/2024  07:54 EDT       How Severe Is Your Skin Lesion?: mild Has Your Skin Lesion Been Treated?: not been treated

## (undated) DEVICE — SUT VIC 3/0 CT1 27IN DYED J338H

## (undated) DEVICE — SUT GUT CHRM 2/0 CT1 27IN 811H

## (undated) DEVICE — COATED VICRYL  (POLYGLACTIN 910) SUTURE, VIOLET BRAIDED, STERILE, SYNTHETIC ABSORBABLE SUTURE: Brand: COATED VICRYL

## (undated) DEVICE — APPL CHLORAPREP TINTED 26ML TEAL

## (undated) DEVICE — MAT PREVALON MOBL TRANSFR AIR W/PAD REPROC 39X81IN

## (undated) DEVICE — TRAP FLD MINIVAC MEGADYNE 100ML

## (undated) DEVICE — ADHS SKIN PREMIERPRO EXOFIN TOPICAL HI/VISC .5ML

## (undated) DEVICE — CLTH CLENS READYCLEANSE PERI CARE PK/5

## (undated) DEVICE — ENSEAL 20 CM SHAFT, LARGE JAW: Brand: ENSEAL X1

## (undated) DEVICE — 4-PORT MANIFOLD: Brand: NEPTUNE 2

## (undated) DEVICE — PENCL SMOKE/EVAC MEGADYNE TELESCP 10FT

## (undated) DEVICE — SOL IRR NACL 0.9PCT BT 1000ML

## (undated) DEVICE — SOL IRR H2O BTL 1000ML STRL

## (undated) DEVICE — TRY SPINE BLCK WHITACRE 25G 3X5IN

## (undated) DEVICE — SUT GUT CHRM 1 CTX 36IN 905H

## (undated) DEVICE — PATIENT RETURN ELECTRODE, SINGLE-USE, CONTACT QUALITY MONITORING, ADULT, WITH 9FT CORD, FOR PATIENTS WEIGING OVER 33LBS. (15KG): Brand: MEGADYNE

## (undated) DEVICE — GLV SURG BIOGEL LTX PF 6 1/2

## (undated) DEVICE — GLV SURG BIOGEL LTX PF 6

## (undated) DEVICE — SUT MONOCRYL SZ 4 0 18IN PS1 Y682H BX/36

## (undated) DEVICE — PK C/SECT 10